# Patient Record
Sex: FEMALE | Race: BLACK OR AFRICAN AMERICAN | NOT HISPANIC OR LATINO
[De-identification: names, ages, dates, MRNs, and addresses within clinical notes are randomized per-mention and may not be internally consistent; named-entity substitution may affect disease eponyms.]

---

## 2018-12-01 PROBLEM — Z00.00 ENCOUNTER FOR PREVENTIVE HEALTH EXAMINATION: Status: ACTIVE | Noted: 2018-12-01

## 2019-01-21 PROBLEM — Z63.4 WIDOWED: Status: ACTIVE | Noted: 2019-01-21

## 2019-01-21 PROBLEM — D25.9 UTERINE FIBROID: Status: ACTIVE | Noted: 2019-01-21

## 2019-01-21 RX ORDER — LATANOPROST 50 UG/ML
0.01 SOLUTION OPHTHALMIC
Refills: 0 | Status: ACTIVE | COMMUNITY

## 2019-01-24 ENCOUNTER — APPOINTMENT (OUTPATIENT)
Dept: HEART AND VASCULAR | Facility: CLINIC | Age: 74
End: 2019-01-24
Payer: MEDICARE

## 2019-01-24 ENCOUNTER — NON-APPOINTMENT (OUTPATIENT)
Age: 74
End: 2019-01-24

## 2019-01-24 VITALS
SYSTOLIC BLOOD PRESSURE: 138 MMHG | DIASTOLIC BLOOD PRESSURE: 74 MMHG | OXYGEN SATURATION: 98 % | BODY MASS INDEX: 24.11 KG/M2 | WEIGHT: 131 LBS | HEIGHT: 62 IN | HEART RATE: 67 BPM

## 2019-01-24 DIAGNOSIS — D25.9 LEIOMYOMA OF UTERUS, UNSPECIFIED: ICD-10-CM

## 2019-01-24 DIAGNOSIS — Z63.4 DISAPPEARANCE AND DEATH OF FAMILY MEMBER: ICD-10-CM

## 2019-01-24 PROCEDURE — 93000 ELECTROCARDIOGRAM COMPLETE: CPT

## 2019-01-24 PROCEDURE — 99213 OFFICE O/P EST LOW 20 MIN: CPT | Mod: 25

## 2019-01-24 RX ORDER — ASPIRIN 81 MG/1
81 TABLET, CHEWABLE ORAL
Qty: 90 | Refills: 3 | Status: ACTIVE | COMMUNITY

## 2019-01-24 SDOH — SOCIAL STABILITY - SOCIAL INSECURITY: DISSAPEARANCE AND DEATH OF FAMILY MEMBER: Z63.4

## 2019-01-24 NOTE — ASSESSMENT
[FreeTextEntry1] : 1. CAD s/p multiple PCI, CCS 1, s/p negative stress echo 10/17/13, s/p 03/07/17: exercise stress echo: EF 65%, 7 METS, normal wall motion and PA pressures, hypertensive BP response. \par             - will continue long term dual antiplatelet therapy given numerous PCIs in the past. \par             - will order an echocardiogram to assess LV function\par \par 2. HTN: BP at ACC/AHA 2017 guideline target\par             - continue labetalol 300mg po bid\par             - continue losartan 100mg po daily\par \par 3. Diabetes type II: \par             - continue metformin 1000mg po bid\par             - continue glimepiride 2mg po daily\par             - discussed with patient therapeutic lifestyle changes to improve glucose metabolism\par \par 4. Dyslipidemia: \par             - continue atorvastatin 80mg po daily\par             - discussed with patient therapeutic lifestyle changes to improve lipid metabolism.

## 2019-01-24 NOTE — REASON FOR VISIT
[Follow-Up - Clinic] : a clinic follow-up of [FreeTextEntry1] : Diagnostic Tests:\par -----------------------------------------------\par ECG:\par 01/24/18: NSR, incomplete RBBB. \par 09/24/18 NSR, DAVID, non-specific ST/T changes. \par 10/18/17: NSR, DAVID, non-specific ST/T changes. \par 11/10/16: NSR, non-specific ST/T changes.\par 02/18/15: NSR, non-specific ST/T changes. \par -----------------------------------------------\par Cath:\par 12/21/11: RCA PCI mid and distal, LCX distal 70%, D2 70%\par 12/18/06: patent stents RCA/LAD/LCX, PCI prox RCA\par 12/12/05: RCA 90%, LM 40%, LAD prox 40%, mid 90%, LCX 90%, EF 60%, PCI LCX/LAD/RCA\par 09/12/05: PCI mid LAD, PCI prox RCA\par 09/09/05: RCA prox 90%, mid 50%,LM 40%, LAD prox 40%, mid 90%, EF 60%, PCI LCx\par ------------------------------------------------\par Echo:\par 06/15/16: EF 65%, grade I diastolic dysfunction, mild MR\par 02/08/11: EF 63%, diastolic dysfunction, trace MR/TR\par -------------------------------------------------\par Stress tests:\par 03/07/17: exercise stress echo: EF 65%, 7 METS, normal wall motion and PA pressures, hypertensive BP response. \par 10/174/13: dobutamine stress echo: EF 70%, normal wall motion.

## 2019-01-24 NOTE — PHYSICAL EXAM
[General Appearance - Well Developed] : well developed [Normal Appearance] : normal appearance [Well Groomed] : well groomed [General Appearance - Well Nourished] : well nourished [No Deformities] : no deformities [General Appearance - In No Acute Distress] : no acute distress [Normal Conjunctiva] : the conjunctiva exhibited no abnormalities [Eyelids - No Xanthelasma] : the eyelids demonstrated no xanthelasmas [Normal Oral Mucosa] : normal oral mucosa [No Oral Pallor] : no oral pallor [No Oral Cyanosis] : no oral cyanosis [Normal Jugular Venous A Waves Present] : normal jugular venous A waves present [Normal Jugular Venous V Waves Present] : normal jugular venous V waves present [No Jugular Venous Andrea A Waves] : no jugular venous andrea A waves [Respiration, Rhythm And Depth] : normal respiratory rhythm and effort [Exaggerated Use Of Accessory Muscles For Inspiration] : no accessory muscle use [Auscultation Breath Sounds / Voice Sounds] : lungs were clear to auscultation bilaterally [Normal Rate] : normal [Normal S1] : normal S1 [Normal S2] : normal S2 [No Murmur] : no murmurs heard [2+] : left 2+ [No Abnormalities] : the abdominal aorta was not enlarged and no bruit was heard [No Pitting Edema] : no pitting edema present [Abdomen Soft] : soft [Abdomen Tenderness] : non-tender [Abdomen Mass (___ Cm)] : no abdominal mass palpated [Abnormal Walk] : normal gait [Gait - Sufficient For Exercise Testing] : the gait was sufficient for exercise testing [Nail Clubbing] : no clubbing of the fingernails [Cyanosis, Localized] : no localized cyanosis [Petechial Hemorrhages (___cm)] : no petechial hemorrhages [Skin Color & Pigmentation] : normal skin color and pigmentation [] : no rash [No Venous Stasis] : no venous stasis [Skin Lesions] : no skin lesions [No Skin Ulcers] : no skin ulcer [No Xanthoma] : no  xanthoma was observed [Oriented To Time, Place, And Person] : oriented to person, place, and time [Affect] : the affect was normal [Mood] : the mood was normal [No Anxiety] : not feeling anxious [S3] : no S3 [S4] : no S4 [Right Carotid Bruit] : no bruit heard over the right carotid [Left Carotid Bruit] : no bruit heard over the left carotid [Right Femoral Bruit] : no bruit heard over the right femoral artery [Left Femoral Bruit] : no bruit heard over the left femoral artery

## 2019-11-11 ENCOUNTER — NON-APPOINTMENT (OUTPATIENT)
Age: 74
End: 2019-11-11

## 2019-11-11 ENCOUNTER — APPOINTMENT (OUTPATIENT)
Dept: HEART AND VASCULAR | Facility: CLINIC | Age: 74
End: 2019-11-11
Payer: MEDICARE

## 2019-11-11 VITALS
DIASTOLIC BLOOD PRESSURE: 71 MMHG | TEMPERATURE: 98.2 F | OXYGEN SATURATION: 97 % | BODY MASS INDEX: 24.67 KG/M2 | HEART RATE: 78 BPM | HEIGHT: 62 IN | SYSTOLIC BLOOD PRESSURE: 148 MMHG | WEIGHT: 134.04 LBS

## 2019-11-11 VITALS — DIASTOLIC BLOOD PRESSURE: 80 MMHG | SYSTOLIC BLOOD PRESSURE: 162 MMHG

## 2019-11-11 PROCEDURE — 99214 OFFICE O/P EST MOD 30 MIN: CPT | Mod: 25

## 2019-11-11 PROCEDURE — 93000 ELECTROCARDIOGRAM COMPLETE: CPT

## 2019-11-11 RX ORDER — LOSARTAN POTASSIUM 100 MG/1
100 TABLET, FILM COATED ORAL DAILY
Qty: 90 | Refills: 3 | Status: DISCONTINUED | COMMUNITY
Start: 1900-01-01 | End: 2019-11-11

## 2019-11-11 NOTE — PHYSICAL EXAM
[II] : a grade 2 [S3] : no S3 [Right Carotid Bruit] : no bruit heard over the right carotid [Left Carotid Bruit] : no bruit heard over the left carotid [S4] : no S4 [Left Femoral Bruit] : no bruit heard over the left femoral artery [Right Femoral Bruit] : no bruit heard over the right femoral artery

## 2019-11-11 NOTE — ASSESSMENT
[FreeTextEntry1] : 1. CAD s/p multiple PCI, CCS 1, s/p negative stress echo 10/17/13, s/p 03/07/17: exercise stress echo: EF 65%, 7 METS, normal wall motion and PA pressures, hypertensive BP response. \par             - will continue long term dual antiplatelet therapy given numerous PCIs in the past. \par             - will send for an exercise stress echocardiogram to rule out inducible ischemia given new onset chest pressure (risks, benefits,alternatives discussed, all procedure related questions answered)\par \par 2. HTN: BP at ACC/AHA 2017 guideline target\par             - continue labetalol 300mg po bid\par             - will change losartan to telmisartan/HCT 80/25mg po QD (possible adverse effects of new medications discussed)\par \par 3. Diabetes type II: \par             - continue metformin 1000mg po bid\par             - continue glimepiride 2mg po daily\par             - discussed with patient therapeutic lifestyle changes to improve glucose metabolism\par \par 4. Dyslipidemia: \par             - continue atorvastatin 80mg po daily\par             - discussed with patient therapeutic lifestyle changes to improve lipid metabolism.

## 2019-11-11 NOTE — HISTORY OF PRESENT ILLNESS
[FreeTextEntry1] : Ms. Schulz presents for follow up and management of CAD s/p multi-vessel PCI, HTN, dyslipidemia, and DM2. She denies chest pain, OZUNA, or edema. She had an echocardiogram on 06/15/16 which revealed an EF of 65%, grade I diastolic dysfunction, and mild MR. She had an exercise stress echocardiogram on 03/07/17 which revealed an EF of 65%, 7 METS, normal wall motion and PA pressures, and a hypertensive BP response.  She has complaints of exertional chest pressure but is not able to describe the symptoms well.  \par \par

## 2019-11-11 NOTE — REASON FOR VISIT
[FreeTextEntry1] : Diagnostic Tests:\par -----------------------------------------------\par ECG:\par 11/11/19:  NSR, normal ECG. \par 01/24/18: NSR, incomplete RBBB. \par 09/24/18  NSR, DAVID, non-specific ST/T changes. \par 10/18/17: NSR, DAVID, non-specific ST/T changes. \par 11/10/16: NSR, non-specific ST/T changes.\par 02/18/15: NSR, non-specific ST/T changes. \par -----------------------------------------------\par Cath:\par 12/21/11: RCA PCI mid and distal, LCX distal 70%, D2 70%\par 12/18/06: patent stents RCA/LAD/LCX, PCI prox RCA\par 12/12/05: RCA 90%, LM 40%, LAD prox 40%, mid 90%, LCX 90%, EF 60%, PCI LCX/LAD/RCA\par 09/12/05: PCI mid LAD, PCI prox RCA\par 09/09/05: RCA prox 90%, mid 50%,LM 40%, LAD prox 40%, mid 90%, EF 60%, PCI LCx\par ------------------------------------------------\par Echo:\par 06/15/16: EF 65%, grade I diastolic dysfunction, mild MR\par 02/08/11: EF 63%, diastolic dysfunction, trace MR/TR\par -------------------------------------------------\par Stress tests:\par 03/07/17: exercise stress echo: EF 65%, 7 METS, normal wall motion and PA pressures, hypertensive BP response. \par 10/174/13: dobutamine stress echo: EF 70%, normal wall motion.

## 2019-11-21 ENCOUNTER — APPOINTMENT (OUTPATIENT)
Dept: INTERNAL MEDICINE | Facility: CLINIC | Age: 74
End: 2019-11-21

## 2019-12-03 ENCOUNTER — APPOINTMENT (OUTPATIENT)
Dept: HEART AND VASCULAR | Facility: CLINIC | Age: 74
End: 2019-12-03
Payer: MEDICARE

## 2019-12-03 PROCEDURE — 93351 STRESS TTE COMPLETE: CPT

## 2020-01-13 ENCOUNTER — APPOINTMENT (OUTPATIENT)
Dept: HEART AND VASCULAR | Facility: CLINIC | Age: 75
End: 2020-01-13
Payer: MEDICARE

## 2020-01-13 VITALS
BODY MASS INDEX: 24.11 KG/M2 | HEIGHT: 62 IN | SYSTOLIC BLOOD PRESSURE: 125 MMHG | TEMPERATURE: 98.1 F | DIASTOLIC BLOOD PRESSURE: 65 MMHG | HEART RATE: 85 BPM | WEIGHT: 131 LBS | OXYGEN SATURATION: 97 %

## 2020-01-13 PROCEDURE — 99214 OFFICE O/P EST MOD 30 MIN: CPT

## 2020-01-13 NOTE — REASON FOR VISIT
[FreeTextEntry1] : Diagnostic Tests:\par -----------------------------------------------\par ECG:\par 11/11/19:  NSR, normal ECG. \par 01/24/18: NSR, incomplete RBBB. \par 09/24/18  NSR, DAVID, non-specific ST/T changes. \par 10/18/17: NSR, DAVID, non-specific ST/T changes. \par 11/10/16: NSR, non-specific ST/T changes.\par 02/18/15: NSR, non-specific ST/T changes. \par -----------------------------------------------\par Cath:\par 12/21/11: RCA PCI mid and distal, LCX distal 70%, D2 70%\par 12/18/06: patent stents RCA/LAD/LCX, PCI prox RCA\par 12/12/05: RCA 90%, LM 40%, LAD prox 40%, mid 90%, LCX 90%, EF 60%, PCI LCX/LAD/RCA\par 09/12/05: PCI mid LAD, PCI prox RCA\par 09/09/05: RCA prox 90%, mid 50%,LM 40%, LAD prox 40%, mid 90%, EF 60%, PCI LCx\par ------------------------------------------------\par Echo:\par 06/15/16: EF 65%, grade I diastolic dysfunction, mild MR\par 02/08/11: EF 63%, diastolic dysfunction, trace MR/TR\par -------------------------------------------------\par Stress tests:\par 12/03/19: exercise stress echo: EF 68%, aortic sclerosis, 7 METS, + exercise-induced ischemia basal inferior wall, normal ECG and PA pressures. \par 03/07/17: exercise stress echo: EF 65%, 7 METS, normal wall motion and PA pressures, hypertensive BP response. \par 10/174/13: dobutamine stress echo: EF 70%, normal wall motion.

## 2020-01-13 NOTE — HISTORY OF PRESENT ILLNESS
[FreeTextEntry1] : Ms. Schulz presents for follow up and management of CAD s/p multi-vessel PCI, HTN, dyslipidemia, and DM2. Last visit, she had complaints of chest pain and she underwent an exercise stress echocardiogram on 12/03/19 which revealed an EF of 68%, aortic sclerosis, 7 METS, + exercise-induced ischemia basal inferior wall, normal ECG and PA pressures.   The decision was made to pursue medical management and consider invasive coronary angiography if the symptoms progressed.  At present, she has stable mild exertional angina.  Her A1c on 11/13/19 was 9.2 and her PMD initiated Trulicity.  \par \par

## 2020-01-13 NOTE — ASSESSMENT
[FreeTextEntry1] : 1. CAD s/p multiple PCI, CCS 1, s/p negative stress echo 10/17/13, s/p 12/03/19: exercise stress echo: EF 68%, aortic sclerosis, 7 METS, + exercise-induced ischemia basal inferior wall, normal ECG and PA pressures. \par             - will continue long term dual antiplatelet therapy given numerous PCIs in the past. \par             - will consider pursuing invasive coronary if symptoms persist\par             - discussed with patient importance of seeking immediate medical attention if anginal type chest pain develops\par \par 2. HTN: BP at ACC/AHA 2017 guideline target\par             - continue labetalol 300mg po bid\par             - continue telmisartan/HCT 80/25mg po QD \par \par 3. Diabetes type II: \par             - continue metformin 1000mg po QD\par             - continue glimepiride 2mg po daily\par             - continue Trulicity 0.75mg SQ weekly\par             - discussed with patient therapeutic lifestyle changes to improve glucose metabolism\par \par 4. Dyslipidemia: LDL 81 (11/13/19)\par             - continue atorvastatin 80mg po daily\par             - discussed with patient therapeutic lifestyle changes to improve lipid metabolism.\par \par 5. r/o RIMA:\par              - will send for a carotid sonogram

## 2020-01-23 ENCOUNTER — APPOINTMENT (OUTPATIENT)
Dept: HEART AND VASCULAR | Facility: CLINIC | Age: 75
End: 2020-01-23
Payer: MEDICARE

## 2020-03-02 ENCOUNTER — APPOINTMENT (OUTPATIENT)
Dept: HEART AND VASCULAR | Facility: CLINIC | Age: 75
End: 2020-03-02
Payer: MEDICARE

## 2020-03-02 VITALS
HEART RATE: 92 BPM | BODY MASS INDEX: 23.6 KG/M2 | TEMPERATURE: 97.6 F | WEIGHT: 128.25 LBS | SYSTOLIC BLOOD PRESSURE: 129 MMHG | DIASTOLIC BLOOD PRESSURE: 75 MMHG | OXYGEN SATURATION: 98 % | HEIGHT: 62 IN

## 2020-03-02 PROCEDURE — 99214 OFFICE O/P EST MOD 30 MIN: CPT

## 2020-03-02 RX ORDER — CLOPIDOGREL BISULFATE 75 MG/1
75 TABLET, FILM COATED ORAL DAILY
Qty: 90 | Refills: 3 | Status: ACTIVE | COMMUNITY
Start: 1900-01-01 | End: 1900-01-01

## 2020-03-02 NOTE — REASON FOR VISIT
[Follow-Up - Clinic] : a clinic follow-up of [FreeTextEntry1] : Diagnostic Tests:\par -----------------------------------------------\par ECG:\par 11/11/19:  NSR, normal ECG. \par 01/24/18: NSR, incomplete RBBB. \par 09/24/18  NSR, DAVID, non-specific ST/T changes. \par 10/18/17: NSR, DAVID, non-specific ST/T changes. \par 11/10/16: NSR, non-specific ST/T changes.\par 02/18/15: NSR, non-specific ST/T changes. \par -----------------------------------------------\par Cath:\par 12/21/11: RCA PCI mid and distal, LCX distal 70%, D2 70%\par 12/18/06: patent stents RCA/LAD/LCX, PCI prox RCA\par 12/12/05: RCA 90%, LM 40%, LAD prox 40%, mid 90%, LCX 90%, EF 60%, PCI LCX/LAD/RCA\par 09/12/05: PCI mid LAD, PCI prox RCA\par 09/09/05: RCA prox 90%, mid 50%,LM 40%, LAD prox 40%, mid 90%, EF 60%, PCI LCx\par ------------------------------------------------\par Echo:\par 06/15/16: EF 65%, grade I diastolic dysfunction, mild MR\par 02/08/11: EF 63%, diastolic dysfunction, trace MR/TR\par -------------------------------------------------\par Stress tests:\par 12/03/19: exercise stress echo: EF 68%, aortic sclerosis, 7 METS, + exercise-induced ischemia basal inferior wall, normal ECG and PA pressures. \par 03/07/17: exercise stress echo: EF 65%, 7 METS, normal wall motion and PA pressures, hypertensive BP response. \par 10/174/13: dobutamine stress echo: EF 70%, normal wall motion.

## 2020-03-02 NOTE — HISTORY OF PRESENT ILLNESS
[FreeTextEntry1] : Ms. Schulz presents for follow up and management of CAD s/p multi-vessel PCI, HTN, dyslipidemia, and DM2. Last visit, she had complaints of chest pain and she underwent an exercise stress echocardiogram on 12/03/19 which revealed an EF of 68%, aortic sclerosis, 7 METS, + exercise-induced ischemia basal inferior wall, normal ECG and PA pressures.   The decision was made to pursue medical management and consider invasive coronary angiography if the symptoms progressed.  Her A1c on 11/13/19 was 9.2 and her PMD initiated Trulicity.  Since her last visit she is feeling well with no cardiovascular compliants. She has complaints of nausea and emesis on Trulicity. She went to see her endocrinologist, Ed Corbett MD and she reports, despite her A1c now being 7.2, he will hold her Trulicity.

## 2020-03-02 NOTE — REVIEW OF SYSTEMS
[Negative] : Heme/Lymph [Nausea] : nausea [Change in Appetite] : change in appetite [Vomiting] : vomiting [Palpitations] : no palpitations

## 2020-03-02 NOTE — PHYSICAL EXAM
[General Appearance - Well Developed] : well developed [Normal Appearance] : normal appearance [Well Groomed] : well groomed [General Appearance - In No Acute Distress] : no acute distress [No Deformities] : no deformities [General Appearance - Well Nourished] : well nourished [Normal Conjunctiva] : the conjunctiva exhibited no abnormalities [Eyelids - No Xanthelasma] : the eyelids demonstrated no xanthelasmas [Normal Oral Mucosa] : normal oral mucosa [No Oral Pallor] : no oral pallor [Normal Jugular Venous A Waves Present] : normal jugular venous A waves present [No Oral Cyanosis] : no oral cyanosis [Normal Jugular Venous V Waves Present] : normal jugular venous V waves present [No Jugular Venous Andrea A Waves] : no jugular venous andrea A waves [Respiration, Rhythm And Depth] : normal respiratory rhythm and effort [Exaggerated Use Of Accessory Muscles For Inspiration] : no accessory muscle use [Auscultation Breath Sounds / Voice Sounds] : lungs were clear to auscultation bilaterally [Abdomen Soft] : soft [Abdomen Mass (___ Cm)] : no abdominal mass palpated [Abdomen Tenderness] : non-tender [Abnormal Walk] : normal gait [Gait - Sufficient For Exercise Testing] : the gait was sufficient for exercise testing [Cyanosis, Localized] : no localized cyanosis [Nail Clubbing] : no clubbing of the fingernails [Petechial Hemorrhages (___cm)] : no petechial hemorrhages [Skin Color & Pigmentation] : normal skin color and pigmentation [] : no rash [No Venous Stasis] : no venous stasis [Skin Lesions] : no skin lesions [No Xanthoma] : no  xanthoma was observed [No Skin Ulcers] : no skin ulcer [Affect] : the affect was normal [Oriented To Time, Place, And Person] : oriented to person, place, and time [Mood] : the mood was normal [No Anxiety] : not feeling anxious [Normal Rate] : normal [Normal S1] : normal S1 [Normal S2] : normal S2 [No Murmur] : no murmurs heard [II] : a grade 2 [2+] : left 2+ [No Abnormalities] : the abdominal aorta was not enlarged and no bruit was heard [No Pitting Edema] : no pitting edema present [S3] : no S3 [S4] : no S4 [Right Carotid Bruit] : no bruit heard over the right carotid [Right Femoral Bruit] : no bruit heard over the right femoral artery [Left Carotid Bruit] : no bruit heard over the left carotid [Left Femoral Bruit] : no bruit heard over the left femoral artery

## 2020-03-02 NOTE — ASSESSMENT
[FreeTextEntry1] : 1. CAD s/p multiple PCI, CCS 1, s/p negative stress echo 10/17/13, s/p 12/03/19: exercise stress echo: EF 68%, aortic sclerosis, 7 METS, + exercise-induced ischemia basal inferior wall, normal ECG and PA pressures. \par             - will continue long term dual antiplatelet therapy given numerous PCIs in the past. \par             - will consider pursuing invasive coronary if symptoms persist\par             - discussed with patient importance of seeking immediate medical attention if anginal type chest pain develops\par \par 2. HTN: BP at ACC/AHA 2017 guideline target\par             - continue labetalol 300mg po bid\par             - continue telmisartan/HCT 80/25mg po QD \par \par 3. Diabetes type II: \par             - continue metformin 1000mg po QD\par             - continue glimepiride 2mg po daily\par             - continue off Trulicity 0.75mg SQ weekly secondarty to nausea and emesis\par             - discussed with patient therapeutic lifestyle changes to improve glucose metabolism\par             - follow up with endocrinologist, Ed Corbett MD \par \par 4. Dyslipidemia: LDL 81 (11/13/19)\par             - continue atorvastatin 80mg po daily\par             - discussed with patient therapeutic lifestyle changes to improve lipid metabolism.\par \par 5. r/o RIMA:\par              - will send for a carotid sonogram (ordered last visit)

## 2020-10-20 ENCOUNTER — APPOINTMENT (OUTPATIENT)
Dept: HEART AND VASCULAR | Facility: CLINIC | Age: 75
End: 2020-10-20
Payer: MEDICARE

## 2020-10-20 ENCOUNTER — NON-APPOINTMENT (OUTPATIENT)
Age: 75
End: 2020-10-20

## 2020-10-20 VITALS — DIASTOLIC BLOOD PRESSURE: 78 MMHG | SYSTOLIC BLOOD PRESSURE: 131 MMHG

## 2020-10-20 VITALS
BODY MASS INDEX: 24.66 KG/M2 | HEART RATE: 72 BPM | WEIGHT: 134 LBS | TEMPERATURE: 98.2 F | SYSTOLIC BLOOD PRESSURE: 148 MMHG | HEIGHT: 62 IN | OXYGEN SATURATION: 97 % | DIASTOLIC BLOOD PRESSURE: 74 MMHG

## 2020-10-20 PROCEDURE — 93000 ELECTROCARDIOGRAM COMPLETE: CPT

## 2020-10-20 PROCEDURE — 99214 OFFICE O/P EST MOD 30 MIN: CPT | Mod: 25

## 2020-10-20 NOTE — REVIEW OF SYSTEMS
[Nausea] : nausea [Vomiting] : vomiting [Change in Appetite] : change in appetite [Negative] : Heme/Lymph [Palpitations] : no palpitations

## 2020-10-20 NOTE — ASSESSMENT
[FreeTextEntry1] : 1. CAD s/p multiple PCI, CCS 1, s/p negative stress echo 10/17/13, s/p 12/03/19: exercise stress echo: EF 68%, aortic sclerosis, 7 METS, + exercise-induced ischemia basal inferior wall, normal ECG and PA pressures. \par             - will continue long term dual antiplatelet therapy given numerous PCIs in the past. \par             - will consider pursuing invasive coronary if symptoms persist\par             - discussed with patient importance of seeking immediate medical attention if anginal type chest pain develops\par             - will send for an echocardiogram\par \par 2. HTN: BP near ACC/AHA 2017 guideline target\par             - continue labetalol 300mg po bid\par             - continue telmisartan/HCT 80/25mg po QD \par \par 3. Diabetes type II: not controlled (A1c 9.1% 10/02/20)\par             - continue metformin 1000mg po QD\par             - continue glimepiride 2mg po daily\par             - continue Tradjenta 5mg po QD\par             - discussed with patient therapeutic lifestyle changes to improve glucose metabolism\par             - follow up with endocrinologist, Ed Corbett MD \par \par 4. Dyslipidemia: lipids at goal\par             - continue atorvastatin 80mg po daily\par             - discussed with patient therapeutic lifestyle changes to improve lipid metabolism.\par \par 5. r/o RIMA:\par              - will send for a carotid sonogram

## 2020-10-20 NOTE — HISTORY OF PRESENT ILLNESS
[FreeTextEntry1] : Ms. Schulz presents for follow up and management of CAD s/p multi-vessel PCI, HTN, dyslipidemia, and DM2.  She underwent an exercise stress echocardiogram on 12/03/19 which revealed an EF of 68%, aortic sclerosis, 7 METS, + exercise-induced ischemia basal inferior wall, normal ECG and PA pressures.   The decision was made to pursue medical management and consider invasive coronary angiography if the symptoms progressed.  She saw an advertisement on television warning of the recent metformin recall and discontinued it without speaking with her endocrinologist.  Her A1c increased to 9.1 as a result.  Her endocrinologist has since restarted the metformin.  She denies chest pain and has OZUNA to 1-2 flights of stairs.

## 2020-10-20 NOTE — REASON FOR VISIT
[Follow-Up - Clinic] : a clinic follow-up of [FreeTextEntry1] : Diagnostic Tests:\par -----------------------------------------------\par ECG:\par 10/20/20: NSR, nonspecific T-wave abnormality. /24/18: NSR, incomplete RBBB. \par 01/11/19:  NSR, normal ECG. \par 09/24/18  NSR, DAVID, non-specific ST/T changes. \par 10/18/17: NSR, DAVID, non-specific ST/T changes. \par 11/10/16: NSR, non-specific ST/T changes.\par 02/18/15: NSR, non-specific ST/T changes. \par -----------------------------------------------\par Cath:\par 12/21/11: RCA PCI mid and distal, LCX distal 70%, D2 70%\par 12/18/06: patent stents RCA/LAD/LCX, PCI prox RCA\par 12/12/05: RCA 90%, LM 40%, LAD prox 40%, mid 90%, LCX 90%, EF 60%, PCI LCX/LAD/RCA\par 09/12/05: PCI mid LAD, PCI prox RCA\par 09/09/05: RCA prox 90%, mid 50%,LM 40%, LAD prox 40%, mid 90%, EF 60%, PCI LCx\par ------------------------------------------------\par Echo:\par 06/15/16: EF 65%, grade I diastolic dysfunction, mild MR\par 02/08/11: EF 63%, diastolic dysfunction, trace MR/TR\par -------------------------------------------------\par Stress tests:\par 12/03/19: exercise stress echo: EF 68%, aortic sclerosis, 7 METS, + exercise-induced ischemia basal inferior wall, normal ECG and PA pressures. \par 03/07/17: exercise stress echo: EF 65%, 7 METS, normal wall motion and PA pressures, hypertensive BP response. \par 10/174/13: dobutamine stress echo: EF 70%, normal wall motion.

## 2020-10-20 NOTE — PHYSICAL EXAM
[General Appearance - Well Developed] : well developed [Normal Appearance] : normal appearance [Well Groomed] : well groomed [General Appearance - Well Nourished] : well nourished [No Deformities] : no deformities [General Appearance - In No Acute Distress] : no acute distress [Normal Conjunctiva] : the conjunctiva exhibited no abnormalities [Eyelids - No Xanthelasma] : the eyelids demonstrated no xanthelasmas [No Oral Pallor] : no oral pallor [Normal Oral Mucosa] : normal oral mucosa [No Oral Cyanosis] : no oral cyanosis [Normal Jugular Venous A Waves Present] : normal jugular venous A waves present [Normal Jugular Venous V Waves Present] : normal jugular venous V waves present [No Jugular Venous Andrea A Waves] : no jugular venous andrea A waves [Exaggerated Use Of Accessory Muscles For Inspiration] : no accessory muscle use [Respiration, Rhythm And Depth] : normal respiratory rhythm and effort [Auscultation Breath Sounds / Voice Sounds] : lungs were clear to auscultation bilaterally [Abdomen Tenderness] : non-tender [Abdomen Soft] : soft [Abdomen Mass (___ Cm)] : no abdominal mass palpated [Abnormal Walk] : normal gait [Gait - Sufficient For Exercise Testing] : the gait was sufficient for exercise testing [Nail Clubbing] : no clubbing of the fingernails [Cyanosis, Localized] : no localized cyanosis [Petechial Hemorrhages (___cm)] : no petechial hemorrhages [Skin Color & Pigmentation] : normal skin color and pigmentation [] : no rash [Skin Lesions] : no skin lesions [No Venous Stasis] : no venous stasis [No Skin Ulcers] : no skin ulcer [Oriented To Time, Place, And Person] : oriented to person, place, and time [No Xanthoma] : no  xanthoma was observed [Affect] : the affect was normal [Mood] : the mood was normal [No Anxiety] : not feeling anxious [Normal Rate] : normal [Normal S1] : normal S1 [Normal S2] : normal S2 [No Murmur] : no murmurs heard [II] : a grade 2 [2+] : right 2+ [No Abnormalities] : the abdominal aorta was not enlarged and no bruit was heard [No Pitting Edema] : no pitting edema present [S3] : no S3 [S4] : no S4 [Right Carotid Bruit] : no bruit heard over the right carotid [Left Carotid Bruit] : no bruit heard over the left carotid [Right Femoral Bruit] : no bruit heard over the right femoral artery [Left Femoral Bruit] : no bruit heard over the left femoral artery

## 2020-11-16 ENCOUNTER — APPOINTMENT (OUTPATIENT)
Dept: HEART AND VASCULAR | Facility: CLINIC | Age: 75
End: 2020-11-16
Payer: MEDICARE

## 2020-11-16 ENCOUNTER — NON-APPOINTMENT (OUTPATIENT)
Age: 75
End: 2020-11-16

## 2020-11-16 PROCEDURE — 93880 EXTRACRANIAL BILAT STUDY: CPT

## 2020-11-16 PROCEDURE — 93306 TTE W/DOPPLER COMPLETE: CPT

## 2021-01-08 ENCOUNTER — RX RENEWAL (OUTPATIENT)
Age: 76
End: 2021-01-08

## 2021-04-11 ENCOUNTER — RX RENEWAL (OUTPATIENT)
Age: 76
End: 2021-04-11

## 2021-04-11 RX ORDER — ATORVASTATIN CALCIUM 80 MG/1
80 TABLET, FILM COATED ORAL DAILY
Qty: 90 | Refills: 3 | Status: ACTIVE | COMMUNITY
Start: 2021-04-11 | End: 1900-01-01

## 2021-10-26 ENCOUNTER — APPOINTMENT (OUTPATIENT)
Dept: HEART AND VASCULAR | Facility: CLINIC | Age: 76
End: 2021-10-26
Payer: MEDICARE

## 2021-10-26 ENCOUNTER — NON-APPOINTMENT (OUTPATIENT)
Age: 76
End: 2021-10-26

## 2021-10-26 VITALS
HEART RATE: 75 BPM | HEIGHT: 62 IN | WEIGHT: 126 LBS | OXYGEN SATURATION: 98 % | BODY MASS INDEX: 23.19 KG/M2 | SYSTOLIC BLOOD PRESSURE: 134 MMHG | DIASTOLIC BLOOD PRESSURE: 69 MMHG

## 2021-10-26 PROCEDURE — 99214 OFFICE O/P EST MOD 30 MIN: CPT | Mod: 25

## 2021-10-26 PROCEDURE — 93000 ELECTROCARDIOGRAM COMPLETE: CPT

## 2021-10-26 NOTE — HISTORY OF PRESENT ILLNESS
[FreeTextEntry1] : Ms. Schulz presents for follow up and management of CAD s/p multi-vessel PCI, HTN, dyslipidemia, and DM2.  She underwent an exercise stress echocardiogram on 12/03/19 which revealed an EF of 68%, aortic sclerosis, 7 METS, + exercise-induced ischemia basal inferior wall, normal ECG and PA pressures.   The decision was made to pursue medical management and consider invasive coronary angiography if the symptoms progressed.  She saw an advertisement on television warning of the recent metformin recall and discontinued it without speaking with her endocrinologist.  Her A1c increased to 9.1 as a result.  Her endocrinologist has since restarted the metformin.  On 11/16/20 she had an echocardiogram which revealed an EF of 67% and aortic sclerosis. On 02/19/21 her daughter called to inform me that she is out of the country in [facility] and has been experiencing chest pain.  At present, she continues to use SL NTG 1-2 times per month.

## 2021-10-26 NOTE — ASSESSMENT
[FreeTextEntry1] : 1. CAD s/p multiple PCI, CCS 1, s/p negative stress echo 10/17/13, s/p 12/03/19: exercise stress echo: EF 68%, aortic sclerosis, 7 METS, + exercise-induced ischemia basal inferior wall, normal ECG and PA pressures. \par             - will continue long term dual antiplatelet therapy given numerous PCIs in the past. \par             - will consider pursuing invasive coronary if symptoms persist\par             - discussed with patient importance of seeking immediate medical attention if anginal type chest pain develops\par             - will send for an exercise stress echocardiogram to rule out inducible ischemia \par             - discussed with patient importance of seeking immediate medical attention if anginal type chest pain develops \par \par 2. HTN: BP near ACC/AHA 2017 guideline target\par             - continue labetalol 300mg po bid\par             - continue telmisartan/HCT 80/25mg po qd\par             - if BP remains above target next visit will up titrate anti-HTN regimen  \par \par 3. Diabetes type II: not controlled (A1c 9.1% 10/02/20)\par             - continue metformin 1000mg po qd\par             - continue glimepiride 2mg po daily\par             - continue Tradjenta 5mg po qd\par             - discussed with patient therapeutic lifestyle changes to improve glucose metabolism\par             - follow up with endocrinologist, Ed Corbett MD \par             - check lab work today \par \par 4. Dyslipidemia: lipids at goal\par             - continue atorvastatin 80mg po daily\par             - discussed with patient therapeutic lifestyle changes to improve lipid metabolism\par             - check lab work \par \par 5. Carotid artery atherosclerosis:\par            - will pursue medical management\par            - will follow with serial carotid artery sonograms (ordered today) \par \par \par An ECG was obtained to evaluate heart rhythm and screen for any underlying cardiac abnormalities.

## 2021-10-27 LAB
ALBUMIN SERPL ELPH-MCNC: 4.5 G/DL
ALP BLD-CCNC: 100 U/L
ALT SERPL-CCNC: 12 U/L
ANION GAP SERPL CALC-SCNC: 17 MMOL/L
AST SERPL-CCNC: 16 U/L
BASOPHILS # BLD AUTO: 0.07 K/UL
BASOPHILS NFR BLD AUTO: 0.7 %
BILIRUB SERPL-MCNC: 0.4 MG/DL
BUN SERPL-MCNC: 14 MG/DL
CALCIUM SERPL-MCNC: 11.2 MG/DL
CHLORIDE SERPL-SCNC: 101 MMOL/L
CHOLEST SERPL-MCNC: 181 MG/DL
CO2 SERPL-SCNC: 23 MMOL/L
CREAT SERPL-MCNC: 0.64 MG/DL
EOSINOPHIL # BLD AUTO: 0.12 K/UL
EOSINOPHIL NFR BLD AUTO: 1.2 %
ESTIMATED AVERAGE GLUCOSE: 237 MG/DL
GLUCOSE SERPL-MCNC: 212 MG/DL
HBA1C MFR BLD HPLC: 9.9 %
HCT VFR BLD CALC: 39 %
HDLC SERPL-MCNC: 54 MG/DL
HGB BLD-MCNC: 12.1 G/DL
IMM GRANULOCYTES NFR BLD AUTO: 0.2 %
LDLC SERPL CALC-MCNC: 82 MG/DL
LDLC SERPL DIRECT ASSAY-MCNC: 95 MG/DL
LYMPHOCYTES # BLD AUTO: 2.95 K/UL
LYMPHOCYTES NFR BLD AUTO: 30.6 %
MAN DIFF?: NORMAL
MCHC RBC-ENTMCNC: 28.4 PG
MCHC RBC-ENTMCNC: 31 GM/DL
MCV RBC AUTO: 91.5 FL
MONOCYTES # BLD AUTO: 0.67 K/UL
MONOCYTES NFR BLD AUTO: 7 %
NEUTROPHILS # BLD AUTO: 5.81 K/UL
NEUTROPHILS NFR BLD AUTO: 60.3 %
NONHDLC SERPL-MCNC: 127 MG/DL
PLATELET # BLD AUTO: 258 K/UL
POTASSIUM SERPL-SCNC: 4.8 MMOL/L
PROT SERPL-MCNC: 7.8 G/DL
RBC # BLD: 4.26 M/UL
RBC # FLD: 14.7 %
SODIUM SERPL-SCNC: 141 MMOL/L
TRIGL SERPL-MCNC: 225 MG/DL
TSH SERPL-ACNC: 1.27 UIU/ML
WBC # FLD AUTO: 9.64 K/UL

## 2021-10-28 LAB — APO LP(A) SERPL-MCNC: 357.3 NMOL/L

## 2021-12-16 ENCOUNTER — APPOINTMENT (OUTPATIENT)
Dept: HEART AND VASCULAR | Facility: CLINIC | Age: 76
End: 2021-12-16

## 2021-12-21 ENCOUNTER — APPOINTMENT (OUTPATIENT)
Dept: HEART AND VASCULAR | Facility: CLINIC | Age: 76
End: 2021-12-21
Payer: MEDICARE

## 2021-12-21 VITALS
BODY MASS INDEX: 22.82 KG/M2 | SYSTOLIC BLOOD PRESSURE: 176 MMHG | WEIGHT: 124 LBS | TEMPERATURE: 97.2 F | DIASTOLIC BLOOD PRESSURE: 76 MMHG | OXYGEN SATURATION: 99 % | HEIGHT: 62 IN | HEART RATE: 80 BPM

## 2021-12-21 VITALS — DIASTOLIC BLOOD PRESSURE: 82 MMHG | SYSTOLIC BLOOD PRESSURE: 168 MMHG

## 2021-12-21 PROCEDURE — 99214 OFFICE O/P EST MOD 30 MIN: CPT

## 2021-12-21 NOTE — REASON FOR VISIT
[FreeTextEntry1] : Diagnostic Tests:\par -----------------------------------------------\par ECG:\par 10/26/21: sinus rhythm, normal ECG. \par 10/20/20: NSR, nonspecific T-wave abnormality.\par 01/11/19:  NSR, normal ECG. \par 09/24/18  NSR, DAVID, non-specific ST/T changes. \par 10/18/17: NSR, DAVID, non-specific ST/T changes. \par 11/10/16: NSR, non-specific ST/T changes.\par 02/18/15: NSR, non-specific ST/T changes. \par -----------------------------------------------\par Cath:\par 12/21/11: RCA PCI mid and distal, LCX distal 70%, D2 70%\par 12/18/06: patent stents RCA/LAD/LCX, PCI prox RCA\par 12/12/05: RCA 90%, LM 40%, LAD prox 40%, mid 90%, LCX 90%, EF 60%, PCI LCX/LAD/RCA\par 09/12/05: PCI mid LAD, PCI prox RCA\par 09/09/05: RCA prox 90%, mid 50%,LM 40%, LAD prox 40%, mid 90%, EF 60%, PCI LCx\par ------------------------------------------------\par Echo:\par 11/16/20: EF 67%, aortic sclerosis. \par 06/15/16: EF 65%, grade I diastolic dysfunction, mild MR\par 02/08/11: EF 63%, diastolic dysfunction, trace MR/TR\par -------------------------------------------------\par Stress tests:\par 12/03/19: exercise stress echo: EF 68%, aortic sclerosis, 7 METS, + exercise-induced ischemia basal inferior wall, normal ECG and PA pressures. \par 03/07/17: exercise stress echo: EF 65%, 7 METS, normal wall motion and PA pressures, hypertensive BP response. \par 10/174/13: dobutamine stress echo: EF 70%, normal wall motion.\par --------------------------------------------------\par Carotid arteries:\par 11/16/20: sono: b/l prox ICA 20-49% stenosis.

## 2021-12-21 NOTE — ASSESSMENT
[FreeTextEntry1] : 1. CAD s/p multiple PCI, CCS 1, s/p negative stress echo 10/17/13, s/p 12/03/19: exercise stress echo: EF 68%, aortic sclerosis, 7 METS, + exercise-induced ischemia basal inferior wall, normal ECG and PA pressures. \par             - will continue long term dual antiplatelet therapy given numerous PCIs in the past. \par             - will consider pursuing invasive coronary if symptoms persist\par             - discussed with patient importance of seeking immediate medical attention if anginal type chest pain develops\par             - will send for an exercise stress echocardiogram to rule out inducible ischemia \par             - discussed with patient importance of seeking immediate medical attention if anginal type chest pain develops \par \par 2. HTN: BP not at ACC/AHA 2017 guideline target: off medications today\par             - continue labetalol 300mg po bid\par             - continue telmisartan/HCT 80/25mg po qd\par             - if BP remains above target next visit will up titrate anti-HTN regimen  \par             - encouraged increased compliance \par \par 3. Diabetes type II: not controlled, A1c 9.4% (12/20/21)\par             - continue metformin 1000mg po qd\par             - continue glimepiride 2mg po daily\par             - continue Tradjenta 5mg po qd\par             - discussed with patient therapeutic lifestyle changes to improve glucose metabolism\par             - follow up with endocrinologist, Ed Corbett MD \par \par 4. Dyslipidemia: lipids at goal, LDL 93 (11/03/21)\par             - continue atorvastatin 80mg po daily\par             - discussed with patient therapeutic lifestyle changes to improve lipid metabolism\par \par 5. Carotid artery atherosclerosis:\par            - will pursue medical management\par            - will follow with serial carotid artery sonograms (ordered today) \par \par 6. UTI: on Farxiga\par            - will treat with Cipro 500mg po qd x 3 days\par            - follow up with PMD and endocrinologist \par \par

## 2021-12-21 NOTE — HISTORY OF PRESENT ILLNESS
[FreeTextEntry1] : Ms. Schulz presents for follow up and management of CAD s/p multi-vessel PCI, HTN, dyslipidemia, and DM2.  On 11/16/20 she had an echocardiogram which revealed an EF of 67% and aortic sclerosis.  For the past two days, she has complaints of dysuria and urinary frequency.  She was evaluated by an NP at her PMD's office and was found to have an abnormal UA.  Given that she is on an SGLT2 inhibitor (Farxiga) we agreed to treat her presumptively for a UTI with ciprofloxacin for 3 days.  In addition, she has complaints of occasional stable exertional angina.

## 2021-12-21 NOTE — REVIEW OF SYSTEMS
[Dyspnea on exertion] : dyspnea during exertion [Chest Discomfort] : chest discomfort [Dysuria] : dysuria

## 2021-12-23 ENCOUNTER — APPOINTMENT (OUTPATIENT)
Dept: HEART AND VASCULAR | Facility: CLINIC | Age: 76
End: 2021-12-23
Payer: MEDICARE

## 2022-02-14 ENCOUNTER — APPOINTMENT (OUTPATIENT)
Dept: HEART AND VASCULAR | Facility: CLINIC | Age: 77
End: 2022-02-14
Payer: MEDICARE

## 2022-02-14 VITALS
BODY MASS INDEX: 22.82 KG/M2 | OXYGEN SATURATION: 100 % | SYSTOLIC BLOOD PRESSURE: 134 MMHG | HEIGHT: 62 IN | WEIGHT: 124 LBS | DIASTOLIC BLOOD PRESSURE: 76 MMHG | HEART RATE: 71 BPM

## 2022-02-14 PROCEDURE — 93351 STRESS TTE COMPLETE: CPT | Mod: 59

## 2022-02-14 PROCEDURE — 99214 OFFICE O/P EST MOD 30 MIN: CPT | Mod: 25

## 2022-02-14 RX ORDER — BLOOD SUGAR DIAGNOSTIC
STRIP MISCELLANEOUS
Qty: 100 | Refills: 0 | Status: ACTIVE | COMMUNITY
Start: 2021-11-03

## 2022-02-14 RX ORDER — LATANOPROST/PF 0.005 %
0.01 DROPS OPHTHALMIC (EYE) DAILY
Refills: 0 | Status: ACTIVE | COMMUNITY
Start: 2022-02-14

## 2022-02-14 RX ORDER — PEN NEEDLE, DIABETIC 29 G X1/2"
31G X 5 MM NEEDLE, DISPOSABLE MISCELLANEOUS
Qty: 100 | Refills: 0 | Status: ACTIVE | COMMUNITY
Start: 2022-02-06

## 2022-02-14 RX ORDER — ISOPROPYL ALCOHOL 0.75 G/1
SWAB TOPICAL
Qty: 100 | Refills: 0 | Status: ACTIVE | COMMUNITY
Start: 2021-11-03

## 2022-02-14 NOTE — HISTORY OF PRESENT ILLNESS
[FreeTextEntry1] : Ms. Schulz presents for follow up and management of CAD s/p multi-vessel PCI, HTN, dyslipidemia, and DM2.  On 11/16/20 she had an echocardiogram which revealed an EF of 67% and aortic sclerosis.  Today, 02/14/22, she had an exercise stress echocardiogram which revealed an EF of 64%, aortic sclerosis, and + basal inferior hypokinesis post 6.6 METS.  She has complaints of claudication to 1-2 blocks ambulation bilaterally.  \par

## 2022-02-14 NOTE — PHYSICAL EXAM
[Well Developed] : well developed [Well Nourished] : well nourished [No Acute Distress] : no acute distress [Normal Venous Pressure] : normal venous pressure [No Carotid Bruit] : no carotid bruit [Normal S1, S2] : normal S1, S2 [No Rub] : no rub [No Gallop] : no gallop [Clear Lung Fields] : clear lung fields [Good Air Entry] : good air entry [No Respiratory Distress] : no respiratory distress  [Soft] : abdomen soft [Non Tender] : non-tender [No Masses/organomegaly] : no masses/organomegaly [Normal Bowel Sounds] : normal bowel sounds [Normal Gait] : normal gait [No Edema] : no edema [No Cyanosis] : no cyanosis [No Clubbing] : no clubbing [No Varicosities] : no varicosities [No Rash] : no rash [No Skin Lesions] : no skin lesions [Moves all extremities] : moves all extremities [No Focal Deficits] : no focal deficits [Normal Speech] : normal speech [Alert and Oriented] : alert and oriented [Normal memory] : normal memory [General Appearance - Well Developed] : well developed [Normal Appearance] : normal appearance [Well Groomed] : well groomed [General Appearance - Well Nourished] : well nourished [No Deformities] : no deformities [General Appearance - In No Acute Distress] : no acute distress [Normal Conjunctiva] : the conjunctiva exhibited no abnormalities [Eyelids - No Xanthelasma] : the eyelids demonstrated no xanthelasmas [Normal Oral Mucosa] : normal oral mucosa [No Oral Pallor] : no oral pallor [No Oral Cyanosis] : no oral cyanosis [Normal Jugular Venous A Waves Present] : normal jugular venous A waves present [Normal Jugular Venous V Waves Present] : normal jugular venous V waves present [No Jugular Venous Andrea A Waves] : no jugular venous andrea A waves [Respiration, Rhythm And Depth] : normal respiratory rhythm and effort [Exaggerated Use Of Accessory Muscles For Inspiration] : no accessory muscle use [Auscultation Breath Sounds / Voice Sounds] : lungs were clear to auscultation bilaterally [Abdomen Soft] : soft [Abdomen Tenderness] : non-tender [Abdomen Mass (___ Cm)] : no abdominal mass palpated [Abnormal Walk] : normal gait [Gait - Sufficient For Exercise Testing] : the gait was sufficient for exercise testing [Nail Clubbing] : no clubbing of the fingernails [Cyanosis, Localized] : no localized cyanosis [Petechial Hemorrhages (___cm)] : no petechial hemorrhages [Skin Color & Pigmentation] : normal skin color and pigmentation [] : no rash [No Venous Stasis] : no venous stasis [Skin Lesions] : no skin lesions [No Skin Ulcers] : no skin ulcer [No Xanthoma] : no  xanthoma was observed [Oriented To Time, Place, And Person] : oriented to person, place, and time [Affect] : the affect was normal [Mood] : the mood was normal [No Anxiety] : not feeling anxious [Normal Rate] : normal [Normal S1] : normal S1 [Normal S2] : normal S2 [No Murmur] : no murmurs heard [II] : a grade 2 [2+] : left 2+ [No Abnormalities] : the abdominal aorta was not enlarged and no bruit was heard [No Pitting Edema] : no pitting edema present [S3] : no S3 [S4] : no S4 [Right Carotid Bruit] : no bruit heard over the right carotid [Right Femoral Bruit] : no bruit heard over the right femoral artery [Left Carotid Bruit] : no bruit heard over the left carotid [Left Femoral Bruit] : no bruit heard over the left femoral artery

## 2022-02-14 NOTE — REVIEW OF SYSTEMS
[Dyspnea on exertion] : dyspnea during exertion [Chest Discomfort] : chest discomfort [Negative] : Heme/Lymph [Muscle Cramps] : muscle cramps [Myalgia] : myalgia [Leg Claudication] : intermittent leg claudication [Dysuria] : no dysuria

## 2022-02-14 NOTE — REASON FOR VISIT
[Other: ____] : [unfilled] [FreeTextEntry1] : Diagnostic Tests:\par -----------------------------------------------\par ECG:\par 10/26/21: sinus rhythm, normal ECG. \par 10/20/20: NSR, nonspecific T-wave abnormality.\par 01/11/19:  NSR, normal ECG. \par 09/24/18  NSR, DAVID, non-specific ST/T changes. \par 10/18/17: NSR, DAVID, non-specific ST/T changes. \par 11/10/16: NSR, non-specific ST/T changes.\par 02/18/15: NSR, non-specific ST/T changes. \par -----------------------------------------------\par Cath:\par 12/21/11: RCA PCI mid and distal, LCX distal 70%, D2 70%\par 12/18/06: patent stents RCA/LAD/LCX, PCI prox RCA\par 12/12/05: RCA 90%, LM 40%, LAD prox 40%, mid 90%, LCX 90%, EF 60%, PCI LCX/LAD/RCA\par 09/12/05: PCI mid LAD, PCI prox RCA\par 09/09/05: RCA prox 90%, mid 50%,LM 40%, LAD prox 40%, mid 90%, EF 60%, PCI LCx\par ------------------------------------------------\par Echo:\par 11/16/20: EF 67%, aortic sclerosis. \par 06/15/16: EF 65%, grade I diastolic dysfunction, mild MR\par 02/08/11: EF 63%, diastolic dysfunction, trace MR/TR\par -------------------------------------------------\par Stress tests:\par 02/14/22: exercise stress echo: EF 64%, aortic sclerosis, + basal inferior hypokinesis post 6.6 METS.\par 12/03/19: exercise stress echo: EF 68%, aortic sclerosis, 7 METS, + exercise-induced ischemia basal inferior wall, normal ECG and PA pressures. \par 03/07/17: exercise stress echo: EF 65%, 7 METS, normal wall motion and PA pressures, hypertensive BP response. \par 10/174/13: dobutamine stress echo: EF 70%, normal wall motion.\par --------------------------------------------------\par Carotid arteries:\par 11/16/20: sono: b/l prox ICA 20-49% stenosis.

## 2022-02-14 NOTE — ASSESSMENT
[FreeTextEntry1] : 1. CAD s/p multiple PCI, CCS 1, s/p: 02/14/22: exercise stress echo: EF 64%, aortic sclerosis, + basal inferior hypokinesis post 6.6 METS.\par             - will continue long term dual antiplatelet therapy given numerous PCIs in the past. \par             - will consider pursuing invasive coronary if symptoms persist\par             - discussed with patient importance of seeking immediate medical attention if anginal type chest pain develops\par             \par 2. HTN: BP at ACC/AHA 2017 guideline target: her PMD switched telmisartan/HCT 80/25 to amlodipine 5\par             - continue labetalol 300mg po bid\par             - continue amlodipine 5mg po qd \par \par 3. Diabetes type II: not controlled, A1c 9.4% (12/20/21)\par             - continue metformin ER 500mg tabs 2 tabs po bid\par             - continue Trulicity 0.75mg/0.5ml sc qweek\par             - discussed with patient therapeutic lifestyle changes to improve glucose metabolism\par             - follow up with endocrinologist, Ed Corbett MD \par             - patient will provide copy of recent lab work from PMD's office for my review \par \par 4. Dyslipidemia: lipids at goal, LDL 93 (11/03/21)\par             - continue atorvastatin 80mg po daily\par             - discussed with patient therapeutic lifestyle changes to improve lipid metabolism\par             - patient will provide copy of recent lab work from PMD's office for my review \par \par 5. Carotid artery atherosclerosis:\par            - will pursue medical management\par            - will follow with serial carotid artery sonograms (ordered today)\par \par 6. r/o Peripheral artery disease:  + claudication:\par            - will send for bilateral lower extremity arterial sonography\par \par

## 2022-05-26 ENCOUNTER — NON-APPOINTMENT (OUTPATIENT)
Age: 77
End: 2022-05-26

## 2022-05-26 ENCOUNTER — APPOINTMENT (OUTPATIENT)
Dept: HEART AND VASCULAR | Facility: CLINIC | Age: 77
End: 2022-05-26
Payer: MEDICARE

## 2022-05-26 VITALS
HEART RATE: 78 BPM | WEIGHT: 123 LBS | SYSTOLIC BLOOD PRESSURE: 148 MMHG | DIASTOLIC BLOOD PRESSURE: 81 MMHG | OXYGEN SATURATION: 99 % | BODY MASS INDEX: 22.63 KG/M2 | HEIGHT: 62 IN | TEMPERATURE: 93.8 F

## 2022-05-26 VITALS — SYSTOLIC BLOOD PRESSURE: 153 MMHG | DIASTOLIC BLOOD PRESSURE: 78 MMHG

## 2022-05-26 PROCEDURE — 93925 LOWER EXTREMITY STUDY: CPT | Mod: 59

## 2022-05-26 PROCEDURE — 93000 ELECTROCARDIOGRAM COMPLETE: CPT

## 2022-05-26 PROCEDURE — 93880 EXTRACRANIAL BILAT STUDY: CPT

## 2022-05-26 PROCEDURE — 99214 OFFICE O/P EST MOD 30 MIN: CPT | Mod: 25

## 2022-05-26 RX ORDER — UBIDECARENONE/VIT E ACET 100MG-5
50 MCG CAPSULE ORAL
Qty: 90 | Refills: 3 | Status: ACTIVE | COMMUNITY
Start: 2022-05-26 | End: 1900-01-01

## 2022-05-26 RX ORDER — AMLODIPINE BESYLATE 10 MG/1
10 TABLET ORAL DAILY
Qty: 90 | Refills: 3 | Status: ACTIVE | COMMUNITY
Start: 2022-02-04 | End: 1900-01-01

## 2022-05-26 NOTE — REVIEW OF SYSTEMS
[Dyspnea on exertion] : dyspnea during exertion [Chest Discomfort] : chest discomfort [Leg Claudication] : intermittent leg claudication [Muscle Cramps] : muscle cramps [Myalgia] : myalgia [Negative] : Heme/Lymph [Dysuria] : no dysuria

## 2022-05-26 NOTE — PHYSICAL EXAM
[Well Developed] : well developed [Well Nourished] : well nourished [No Acute Distress] : no acute distress [Normal Venous Pressure] : normal venous pressure [No Carotid Bruit] : no carotid bruit [Normal S1, S2] : normal S1, S2 [No Rub] : no rub [No Gallop] : no gallop [Clear Lung Fields] : clear lung fields [Good Air Entry] : good air entry [No Respiratory Distress] : no respiratory distress  [Soft] : abdomen soft [Non Tender] : non-tender [No Masses/organomegaly] : no masses/organomegaly [Normal Bowel Sounds] : normal bowel sounds [Normal Gait] : normal gait [No Edema] : no edema [No Cyanosis] : no cyanosis [No Clubbing] : no clubbing [No Varicosities] : no varicosities [No Rash] : no rash [No Skin Lesions] : no skin lesions [Moves all extremities] : moves all extremities [No Focal Deficits] : no focal deficits [Normal Speech] : normal speech [Alert and Oriented] : alert and oriented [Normal memory] : normal memory [General Appearance - Well Developed] : well developed [Normal Appearance] : normal appearance [Well Groomed] : well groomed [General Appearance - Well Nourished] : well nourished [No Deformities] : no deformities [General Appearance - In No Acute Distress] : no acute distress [Normal Conjunctiva] : the conjunctiva exhibited no abnormalities [Eyelids - No Xanthelasma] : the eyelids demonstrated no xanthelasmas [Normal Oral Mucosa] : normal oral mucosa [No Oral Pallor] : no oral pallor [No Oral Cyanosis] : no oral cyanosis [Normal Jugular Venous A Waves Present] : normal jugular venous A waves present [Normal Jugular Venous V Waves Present] : normal jugular venous V waves present [No Jugular Venous Andrea A Waves] : no jugular venous andrea A waves [Respiration, Rhythm And Depth] : normal respiratory rhythm and effort [Exaggerated Use Of Accessory Muscles For Inspiration] : no accessory muscle use [Auscultation Breath Sounds / Voice Sounds] : lungs were clear to auscultation bilaterally [Abdomen Soft] : soft [Abdomen Tenderness] : non-tender [Abdomen Mass (___ Cm)] : no abdominal mass palpated [Abnormal Walk] : normal gait [Gait - Sufficient For Exercise Testing] : the gait was sufficient for exercise testing [Nail Clubbing] : no clubbing of the fingernails [Cyanosis, Localized] : no localized cyanosis [Petechial Hemorrhages (___cm)] : no petechial hemorrhages [Skin Color & Pigmentation] : normal skin color and pigmentation [] : no rash [No Venous Stasis] : no venous stasis [Skin Lesions] : no skin lesions [No Skin Ulcers] : no skin ulcer [No Xanthoma] : no  xanthoma was observed [Oriented To Time, Place, And Person] : oriented to person, place, and time [Affect] : the affect was normal [Mood] : the mood was normal [No Anxiety] : not feeling anxious [Normal Rate] : normal [Normal S1] : normal S1 [Normal S2] : normal S2 [No Murmur] : no murmurs heard [II] : a grade 2 [2+] : left 2+ [No Abnormalities] : the abdominal aorta was not enlarged and no bruit was heard [No Pitting Edema] : no pitting edema present [S3] : no S3 [S4] : no S4 [Right Carotid Bruit] : no bruit heard over the right carotid [Left Carotid Bruit] : no bruit heard over the left carotid [Right Femoral Bruit] : no bruit heard over the right femoral artery [Left Femoral Bruit] : no bruit heard over the left femoral artery

## 2022-05-26 NOTE — ASSESSMENT
[FreeTextEntry1] : 1. CAD s/p multiple PCI, CCS 1, s/p: 02/14/22: exercise stress echo: EF 64%, aortic sclerosis, + basal inferior hypokinesis post 6.6 METS.\par             - will continue long term dual antiplatelet therapy given numerous PCIs in the past. \par             - will consider pursuing invasive coronary if symptoms persist\par             - discussed with patient importance of seeking immediate medical attention if anginal type chest pain develops\par             \par 2. HTN: BP not at ACC/AHA 2017 guideline target: her PMD switched telmisartan/HCT 80/25 to amlodipine 5\par             - continue labetalol 300mg po bid\par             - increase amlodipine from 5mg po qd to 10mg po qd \par \par 3. Diabetes type II: not controlled, A1c 8.6% (05/20/22)\par             - continue metformin ER 500mg tabs 2 tabs po bid\par             - continue Trulicity 0.75mg/0.5ml sc qweek\par             - discussed with patient therapeutic lifestyle changes to improve glucose metabolism\par             - follow up with endocrinologist, Ed Corbett MD \par \par 4. Dyslipidemia: lipids at goal, LDL 93 (11/03/21)\par             - continue atorvastatin 80mg po daily\par             - discussed with patient therapeutic lifestyle changes to improve lipid metabolism\par \par 5. Carotid artery atherosclerosis: [place] 20-49% stenosis, LICA 1-19% stenosis: \par            - will pursue medical management\par            - will follow with serial carotid artery sonograms \par \par 6. Peripheral artery disease:  b/l PER and PTA segmental occlusions\par            - will follow with bilateral lower extremity arterial sonography\par            - will pursue aggressive medical management\par \par \par An ECG was obtained to evaluate heart rhythm and screen for any underlying cardiac abnormalities. \par \par

## 2022-05-26 NOTE — REASON FOR VISIT
[Other: ____] : [unfilled] [FreeTextEntry1] : Diagnostic Tests:\par -----------------------------------------------\par ECG:\par 05/26/22: sinus rhythm, nonspecific T-wave abnormality. \par 10/26/21: sinus rhythm, normal ECG. \par 10/20/20: NSR, nonspecific T-wave abnormality.\par 01/11/19:  NSR, normal ECG. \par 09/24/18  NSR, DAVID, non-specific ST/T changes. \par 10/18/17: NSR, DAVID, non-specific ST/T changes. \par 11/10/16: NSR, non-specific ST/T changes.\par 02/18/15: NSR, non-specific ST/T changes. \par -----------------------------------------------\par Cath:\par 12/21/11: RCA PCI mid and distal, LCX distal 70%, D2 70%\par 12/18/06: patent stents RCA/LAD/LCX, PCI prox RCA\par 12/12/05: RCA 90%, LM 40%, LAD prox 40%, mid 90%, LCX 90%, EF 60%, PCI LCX/LAD/RCA\par 09/12/05: PCI mid LAD, PCI prox RCA\par 09/09/05: RCA prox 90%, mid 50%,LM 40%, LAD prox 40%, mid 90%, EF 60%, PCI LCx\par ------------------------------------------------\par Echo:\par 11/16/20: EF 67%, aortic sclerosis. \par 06/15/16: EF 65%, grade I diastolic dysfunction, mild MR\par 02/08/11: EF 63%, diastolic dysfunction, trace MR/TR\par -------------------------------------------------\par Stress tests:\par 02/14/22: exercise stress echo: EF 64%, aortic sclerosis, + basal inferior hypokinesis post 6.6 METS.\par 12/03/19: exercise stress echo: EF 68%, aortic sclerosis, 7 METS, + exercise-induced ischemia basal inferior wall, normal ECG and PA pressures. \par 03/07/17: exercise stress echo: EF 65%, 7 METS, normal wall motion and PA pressures, hypertensive BP response. \par 10/174/13: dobutamine stress echo: EF 70%, normal wall motion.\par --------------------------------------------------\par Carotid arteries:\par 05/26/22: sono: [place] 20-49% stenosis, LICA 1-19% stenosis.\par --------------------------------------------------\par Lower extremity arteries:\par 05/26/22: sono: possible b/l PER and PTA segmental occlusions. \par 11/16/20: sono: b/l prox ICA 20-49% stenosis.

## 2022-05-26 NOTE — HISTORY OF PRESENT ILLNESS
[FreeTextEntry1] : Ms. Schulz presents for follow up and management of CAD s/p multi-vessel PCI, HTN, dyslipidemia, and DM2.  On 11/16/20 she had an echocardiogram which revealed an EF of 67% and aortic sclerosis.  On 2/14/22, she had an exercise stress echocardiogram which revealed an EF of 64%, aortic sclerosis, and + basal inferior hypokinesis post 6.6 METS.  Today, 05/26/22, she had bilateral lower extremity arterial sonography which revealed possible bilateral peroneal and posterior tibial artery segmental occlusions with otherwise mild diffuse atherosclerosis. \par

## 2022-09-20 ENCOUNTER — APPOINTMENT (OUTPATIENT)
Dept: HEART AND VASCULAR | Facility: CLINIC | Age: 77
End: 2022-09-20

## 2022-09-20 VITALS
HEART RATE: 69 BPM | DIASTOLIC BLOOD PRESSURE: 75 MMHG | HEIGHT: 62 IN | OXYGEN SATURATION: 98 % | WEIGHT: 127.7 LBS | BODY MASS INDEX: 23.5 KG/M2 | SYSTOLIC BLOOD PRESSURE: 131 MMHG

## 2022-09-20 PROCEDURE — 99214 OFFICE O/P EST MOD 30 MIN: CPT

## 2022-10-26 NOTE — ASSESSMENT
[FreeTextEntry1] : 1. CAD s/p multiple PCI, CCS 1, s/p: 02/14/22: exercise stress echo: EF 64%, aortic sclerosis, + basal inferior hypokinesis post 6.6 METS.\par             - will continue long term dual antiplatelet therapy given numerous PCIs in the past. \par             - will consider pursuing invasive coronary if symptoms persist\par             - discussed with patient importance of seeking immediate medical attention if anginal type chest pain develops\par             \par 2. HTN: BP at ACC/AHA 2017 guideline target: her PMD switched telmisartan/HCT 80/25 to amlodipine 5\par             - continue labetalol 300mg po bid\par             - continue amlodipine 10mg po qd \par \par 3. Diabetes type II: not controlled, A1c 8.6% (05/20/22): had 2 perineal infections with SGLT2-I\par             - continue metformin ER 500mg tabs 2 tabs po bid\par             - continue Trulicity 0.75mg/0.5ml sc qweek\par             - discussed with patient therapeutic lifestyle changes to improve glucose metabolism\par             - follow up with endocrinologist, Ed Corbett MD \par \par 4. Dyslipidemia: lipids at goal, LDL 93 (11/03/21)\par             - continue atorvastatin 80mg po daily\par             - discussed with patient therapeutic lifestyle changes to improve lipid metabolism\par \par 5. Carotid artery atherosclerosis: DALLAS 20-49% stenosis, LICA 1-19% stenosis: \par            - will pursue medical management\par            - will follow with serial carotid artery sonograms \par \par 6. Peripheral artery disease:  b/l PER and PTA segmental occlusions\par            - will follow with bilateral lower extremity arterial sonography\par            - will pursue aggressive medical management\par \par \par

## 2022-10-26 NOTE — ADDENDUM
[FreeTextEntry1] : 10/26/22; Ms. Schulz is scheduled to undergo a colonoscopy with Helen White MD.  She has a history of CAD with multivessel PCI.  She had an exercise stress echocardiogram on 02/14/22 which revealed mild exercise-induced ischemia.  She denies chest pain and is medically optimized.  She may, therefore, proceed with surgery without cardiac contraindications. She may hold aspirin for 5 days prior to the procedure if required.

## 2022-10-26 NOTE — HISTORY OF PRESENT ILLNESS
[FreeTextEntry1] : Ms. Schulz presents for follow up and management of CAD s/p multi-vessel PCI, HTN, dyslipidemia, and DM2.  On 11/16/20 she had an echocardiogram which revealed an EF of 67% and aortic sclerosis.  On 2/14/22, she had an exercise stress echocardiogram which revealed an EF of 64%, aortic sclerosis, and + basal inferior hypokinesis post 6.6 METS.  On 05/26/22, she had bilateral lower extremity arterial sonography which revealed possible bilateral peroneal and posterior tibial artery segmental occlusions with otherwise mild diffuse atherosclerosis. At present, she feels well.  \par

## 2022-10-27 ENCOUNTER — APPOINTMENT (OUTPATIENT)
Dept: HEART AND VASCULAR | Facility: CLINIC | Age: 77
End: 2022-10-27

## 2022-10-27 ENCOUNTER — NON-APPOINTMENT (OUTPATIENT)
Age: 77
End: 2022-10-27

## 2022-10-27 VITALS — SYSTOLIC BLOOD PRESSURE: 127 MMHG | DIASTOLIC BLOOD PRESSURE: 77 MMHG

## 2022-10-27 VITALS
HEIGHT: 62 IN | TEMPERATURE: 94.1 F | HEART RATE: 81 BPM | BODY MASS INDEX: 23.55 KG/M2 | WEIGHT: 128 LBS | OXYGEN SATURATION: 98 % | DIASTOLIC BLOOD PRESSURE: 78 MMHG | SYSTOLIC BLOOD PRESSURE: 139 MMHG

## 2022-10-27 DIAGNOSIS — B37.31 ACUTE CANDIDIASIS OF VULVA AND VAGINA: ICD-10-CM

## 2022-10-27 DIAGNOSIS — Z87.440 PERSONAL HISTORY OF URINARY (TRACT) INFECTIONS: ICD-10-CM

## 2022-10-27 PROCEDURE — 99214 OFFICE O/P EST MOD 30 MIN: CPT | Mod: 25

## 2022-10-27 PROCEDURE — 93000 ELECTROCARDIOGRAM COMPLETE: CPT

## 2022-10-27 PROCEDURE — 93925 LOWER EXTREMITY STUDY: CPT | Mod: 59

## 2022-10-27 RX ORDER — CLOTRIMAZOLE 10 MG/G
1 CREAM TOPICAL AT BEDTIME
Qty: 1 | Refills: 1 | Status: ACTIVE | COMMUNITY
Start: 2022-10-27 | End: 1900-01-01

## 2022-10-27 NOTE — REASON FOR VISIT
[FreeTextEntry1] : Diagnostic Tests:\par -----------------------------------------------\par ECG:\par 10/27/22: sinus rhythm, normal ECG. \par 05/26/22: sinus rhythm, nonspecific T-wave abnormality. \par 10/26/21: sinus rhythm, normal ECG. \par 10/20/20: NSR, nonspecific T-wave abnormality.\par 01/11/19:  NSR, normal ECG. \par 09/24/18  NSR, DAVID, non-specific ST/T changes. \par 10/18/17: NSR, DAVID, non-specific ST/T changes. \par 11/10/16: NSR, non-specific ST/T changes.\par 02/18/15: NSR, non-specific ST/T changes. \par -----------------------------------------------\par Cath:\par 12/21/11: RCA PCI mid and distal, LCX distal 70%, D2 70%\par 12/18/06: patent stents RCA/LAD/LCX, PCI prox RCA\par 12/12/05: RCA 90%, LM 40%, LAD prox 40%, mid 90%, LCX 90%, EF 60%, PCI LCX/LAD/RCA\par 09/12/05: PCI mid LAD, PCI prox RCA\par 09/09/05: RCA prox 90%, mid 50%,LM 40%, LAD prox 40%, mid 90%, EF 60%, PCI LCx\par ------------------------------------------------\par Echo:\par 11/16/20: EF 67%, aortic sclerosis. \par 06/15/16: EF 65%, grade I diastolic dysfunction, mild MR\par 02/08/11: EF 63%, diastolic dysfunction, trace MR/TR\par -------------------------------------------------\par Stress tests:\par 02/14/22: exercise stress echo: EF 64%, aortic sclerosis, + basal inferior hypokinesis post 6.6 METS.\par 12/03/19: exercise stress echo: EF 68%, aortic sclerosis, 7 METS, + exercise-induced ischemia basal inferior wall, normal ECG and PA pressures. \par 03/07/17: exercise stress echo: EF 65%, 7 METS, normal wall motion and PA pressures, hypertensive BP response. \par 10/174/13: dobutamine stress echo: EF 70%, normal wall motion.\par --------------------------------------------------\par Carotid arteries:\par 05/26/22: sono: DALLAS 20-49% stenosis, LICA 1-19% stenosis.\par --------------------------------------------------\par Lower extremity arteries:\par 10/27/22: sono: right mPTA 100% with distal reconstitution, rPER, lPTA,LPER heavily calcified with biphasic flow. \par 05/26/22: sono: possible b/l PER and PTA segmental occlusions. \par 11/16/20: sono: b/l prox ICA 20-49% stenosis.

## 2022-10-27 NOTE — HISTORY OF PRESENT ILLNESS
[FreeTextEntry1] : Ms. Schulz presents for follow up and management of CAD s/p multi-vessel PCI, HTN, dyslipidemia, PAD, and DM2.   On 2/14/22, she had an exercise stress echocardiogram which revealed an EF of 64%, aortic sclerosis, and + basal inferior hypokinesis post 6.6 METS.  At present, she has complaints of bilateral calf claudication to 2 blocks ambulation and OZUNA to 2-3 blocks ambulation.  She denies chest pain.  Today, 10/27/22, she had bilateral lower extremity arterial sonography which revealed right mPTA occlusion with distal reconstitution, and rPER, lPER, and Robert severely calcified vessels with biphasic flow.  \par

## 2022-10-27 NOTE — ASSESSMENT
[FreeTextEntry1] : 1. CAD s/p multiple PCI, CCS 1, s/p: 02/14/22: exercise stress echo: EF 64%, aortic sclerosis, + basal inferior hypokinesis post 6.6 METS.\par             - will continue long term dual antiplatelet therapy given numerous PCIs in the past. \par             - will send for a CCTA given anginal symptoms \par             - discussed with patient importance of seeking immediate medical attention if anginal type chest pain develops\par             \par 2. HTN: BP at ACC/AHA 2017 guideline target: \par             - continue labetalol 300mg po bid\par             - continue amlodipine 10mg po qd \par \par 3. Diabetes type II: not controlled: had 2 perineal infections with SGLT2-I: A1c 9.1% (09/21/22): \par             - continue metformin ER 500mg tabs 2 tabs po bid\par             - continue Trulicity 0.75mg/0.5ml sc q week\par             - discussed with patient therapeutic lifestyle changes to improve glucose metabolism\par             - discussed speaking with her PMD to obtain referral to a new endocrinologist\par \par 4. Dyslipidemia: lipids at goal, LDL 93 (11/03/21)\par             - continue atorvastatin 80mg po daily\par             - discussed with patient therapeutic lifestyle changes to improve lipid metabolism\par             - check lab work today \par \par 5. Carotid artery atherosclerosis: DALLAS 20-49% stenosis, LICA 1-19% stenosis: \par            - will pursue medical management\par            - will follow with serial carotid artery sonograms \par \par 6. Peripheral artery disease: + right mid PTA occlusion with distal reconstitution: \par            - will follow with bilateral lower extremity arterial sonography\par            - will pursue aggressive medical management\par \par \par \par

## 2022-10-28 LAB
ALBUMIN SERPL ELPH-MCNC: 4.6 G/DL
ALP BLD-CCNC: 107 U/L
ALT SERPL-CCNC: 19 U/L
ANION GAP SERPL CALC-SCNC: 14 MMOL/L
AST SERPL-CCNC: 15 U/L
BASOPHILS # BLD AUTO: 0.07 K/UL
BASOPHILS NFR BLD AUTO: 0.7 %
BILIRUB SERPL-MCNC: 0.4 MG/DL
BUN SERPL-MCNC: 11 MG/DL
CALCIUM SERPL-MCNC: 10.6 MG/DL
CHLORIDE SERPL-SCNC: 102 MMOL/L
CHOLEST SERPL-MCNC: 149 MG/DL
CO2 SERPL-SCNC: 25 MMOL/L
CREAT SERPL-MCNC: 0.57 MG/DL
EGFR: 95 ML/MIN/1.73M2
EOSINOPHIL # BLD AUTO: 0.1 K/UL
EOSINOPHIL NFR BLD AUTO: 1 %
ESTIMATED AVERAGE GLUCOSE: 200 MG/DL
GLUCOSE SERPL-MCNC: 204 MG/DL
HBA1C MFR BLD HPLC: 8.6 %
HCT VFR BLD CALC: 39.9 %
HDLC SERPL-MCNC: 51 MG/DL
HGB BLD-MCNC: 12.5 G/DL
IMM GRANULOCYTES NFR BLD AUTO: 0.4 %
LDLC SERPL DIRECT ASSAY-MCNC: 70 MG/DL
LYMPHOCYTES # BLD AUTO: 2.28 K/UL
LYMPHOCYTES NFR BLD AUTO: 22.3 %
MAN DIFF?: NORMAL
MCHC RBC-ENTMCNC: 28.9 PG
MCHC RBC-ENTMCNC: 31.3 GM/DL
MCV RBC AUTO: 92.4 FL
MONOCYTES # BLD AUTO: 0.58 K/UL
MONOCYTES NFR BLD AUTO: 5.7 %
NEUTROPHILS # BLD AUTO: 7.15 K/UL
NEUTROPHILS NFR BLD AUTO: 69.9 %
PLATELET # BLD AUTO: 298 K/UL
POTASSIUM SERPL-SCNC: 4.8 MMOL/L
PROT SERPL-MCNC: 7.8 G/DL
RBC # BLD: 4.32 M/UL
RBC # FLD: 13.6 %
SODIUM SERPL-SCNC: 141 MMOL/L
TRIGL SERPL-MCNC: 250 MG/DL
TSH SERPL-ACNC: 0.53 UIU/ML
WBC # FLD AUTO: 10.22 K/UL

## 2022-10-31 ENCOUNTER — APPOINTMENT (OUTPATIENT)
Dept: CT IMAGING | Facility: CLINIC | Age: 77
End: 2022-10-31

## 2022-10-31 ENCOUNTER — OUTPATIENT (OUTPATIENT)
Dept: OUTPATIENT SERVICES | Facility: HOSPITAL | Age: 77
LOS: 1 days | End: 2022-10-31

## 2022-10-31 ENCOUNTER — RESULT REVIEW (OUTPATIENT)
Age: 77
End: 2022-10-31

## 2022-10-31 ENCOUNTER — NON-APPOINTMENT (OUTPATIENT)
Age: 77
End: 2022-10-31

## 2022-10-31 PROCEDURE — 75574 CT ANGIO HRT W/3D IMAGE: CPT | Mod: 26,MH

## 2022-11-04 VITALS
HEIGHT: 62 IN | RESPIRATION RATE: 18 BRPM | DIASTOLIC BLOOD PRESSURE: 86 MMHG | OXYGEN SATURATION: 100 % | SYSTOLIC BLOOD PRESSURE: 165 MMHG | WEIGHT: 125 LBS | HEART RATE: 63 BPM | TEMPERATURE: 98 F

## 2022-11-04 RX ORDER — CHLORHEXIDINE GLUCONATE 213 G/1000ML
1 SOLUTION TOPICAL ONCE
Refills: 0 | Status: DISCONTINUED | OUTPATIENT
Start: 2022-11-10 | End: 2022-11-11

## 2022-11-04 NOTE — H&P ADULT - ASSESSMENT
77yo Female w/ PMHx of HTN, HLD, PAD, DM T2, and CAD (s/p multi-vessel PCIs) who presented to outpatient cardiologist, Dr. Finkelstein with Class III anginal equivalent symptoms and found with subsequent abnormal CCTA, now presents to [facility] for recommended left heart cardiac catheterization with possible intervention if clinically indicated.    EKG:					  ASA _____				Mallampati class: _________	            Anginal Class: _________    -Allergy Status:   -H/H = *****Pt denies BRBPR, hematuria, hematochezia, melena. Pt given ASA:  and Plavix:     -BUN/Cr = **. EF****. Euvolemic on exam. IV NS @ *****started pre procedure    Sedation Plan:   ? None   ? Moderate    ?  Deep    ?  General Anesthesia   Patient Is Suitable Candidate For Sedation?     ? Yes   ? No   ? Not Applicable     Risks & benefits of procedure and sedation and risks and benefits for the alternative therapy have been explained to the patient in layman’s terms including but not limited to: allergic reaction, bleeding, infection, arrhythmia, respiratory compromise, renal and vascular compromise, limb damage, MI, CVA, emergent CABG/Vascular Surgery and death. Informed consent obtained and in chart.   75yo Female, SEVERE SHELLFISH ALLERGY, w/ PMHx of HTN, HLD, PAD, DM T2, and CAD (s/p multi-vessel PCIs) who presented to outpatient cardiologist, Dr. Finkelsteinwith Class III anginal equivalent symptoms and found with subsequent abnormal CCTA, now presents to [facility] for recommended left heart cardiac catheterization with possible intervention if clinically indicated.    EKGSB @ 58bpm, no acute ischemic changes  				  ASA:  III			Mallampati class: II            Anginal Class 3 anginal equivalent symptoms    -Allergy Status: Severe Shellfish Allergy (Lobster: Itching/Redness/Swelling)****Premedicated with  -H/H = Stable. Pt denies BRBPR, hematuria, hematochezia, melena. Pt took home ASA 81mg and Plavix 75mg at home prior to the Cath Lab  -BUN/Cr = WNL, EF unknown.   Euvolemic on exam. IV NS @ 75cc x 2 hours only 2/2 to elevated BP 160s.    -Magnesium 1.5, repleted with MgOx 400mg PO x 1 dose    Sedation Plan: Moderate     Patient Is Suitable Candidate For Sedation:  Yes    Risks & benefits of procedure and sedation and risks and benefits for the alternative therapy have been explained to the patient in layman’s terms including but not limited to: allergic reaction, bleeding, infection, arrhythmia, respiratory compromise, renal and vascular compromise, limb damage, MI, CVA, emergent CABG/Vascular Surgery and death. Informed consent obtained and in chart.   77yo Female, SEVERE SHELLFISH ALLERGY, w/ PMHx of HTN, HLD, PAD, DM T2, and CAD (s/p multi-vessel PCIs) who presented to outpatient cardiologist, Dr. Finkelsteinwith Class III anginal equivalent symptoms and found with subsequent abnormal CCTA, now presents to [facility] for recommended left heart cardiac catheterization with possible intervention if clinically indicated.    EKGSB @ 58bpm, no acute ischemic changes  				  ASA:  III			Mallampati class: II            Anginal Class 3 anginal equivalent symptoms    -Allergy Status: Severe Shellfish Allergy (Lobster: Itching/Redness/Swelling).  Pt premedicated prior to Cath with Solu-Cortef 200mg IV x 1 dose and Benadryl 50mg IVP x 1 dose to be given on cath lab table  -H/H = Stable. Pt denies BRBPR, hematuria, hematochezia, melena. Pt took home ASA 81mg and Plavix 75mg at home prior to the Cath Lab  -BUN/Cr = WNL, EF unknown.   Euvolemic on exam. IV NS @ 75cc x 2 hours only 2/2 to elevated BP 160s.    -Magnesium 1.5, repleted with MgOx 400mg PO x 1 dose    Sedation Plan: Moderate     Patient Is Suitable Candidate For Sedation:  Yes    Risks & benefits of procedure and sedation and risks and benefits for the alternative therapy have been explained to the patient in layman’s terms including but not limited to: allergic reaction, bleeding, infection, arrhythmia, respiratory compromise, renal and vascular compromise, limb damage, MI, CVA, emergent CABG/Vascular Surgery and death. Informed consent obtained and in chart.

## 2022-11-04 NOTE — H&P ADULT - NSICDXPASTMEDICALHX_GEN_ALL_CORE_FT
PAST MEDICAL HISTORY:  CAD (coronary artery disease)     DM (diabetes mellitus)     HLD (hyperlipidemia)     PAD (peripheral artery disease)

## 2022-11-04 NOTE — H&P ADULT - NSHPLABSRESULTS_GEN_ALL_CORE
11.8   9.04  )-----------( 279      ( 10 Nov 2022 10:57 )             36.4       11-10    137  |  101  |  9   ----------------------------<  182<H>  4.3   |  28  |  0.56    Ca    10.2      10 Nov 2022 11:29  Mg     1.5     11-10    TPro  8.1  /  Alb  4.8  /  TBili  0.4  /  DBili  x   /  AST  19  /  ALT  19  /  AlkPhos  95  11-10      PT/INR - ( 10 Nov 2022 10:57 )   PT: 12.8 sec;   INR: 1.07          PTT - ( 10 Nov 2022 10:57 )  PTT:30.1 sec    CARDIAC MARKERS ( 10 Nov 2022 11:29 )  x     / x     / 65 U/L / x     / 1.4 ng/mL

## 2022-11-04 NOTE — H&P ADULT - HISTORY OF PRESENT ILLNESS
CARDIOLOGIST: Dr. Finkelstein   COVID:   PHARMACY:   ESCORT:     Pt is 77yo Female w/ PMHx of HTN, HLD, PAD, DM T2, and CAD (s/p multi-vessel PCIs) who presented to outpatient cardiologist, Dr. Finkelstein, for             Cardiac Cath Hx:  12/2011: PCI mRCA/dRCA; dLCx 70%, D2 70% CARDIOLOGIST: Dr. Finkelstein   COVID:   PHARMACY:   ESCORT:     Pt is 77yo Female w/ PMHx of HTN, HLD, PAD, DM T2, and CAD (s/p multi-vessel PCIs) who presented to outpatient cardiologist, Dr. Finkelstein, for evaluation. Patient endorsing significant OZUNA w/ ambulation of 2-3 blocks, as well as B/L LE claudications symptoms (PAD on arterial duplex - plan for peripheral intervention after cardiac catheterization). Pt denies CP, dizziness/syncope, palpitations, diaphoresis, abdominal pain, N/V, fever/chills, LE edema, orthopnea, PND.      Stress Echo (2/14/22): basal inferior hypokinesis.   CCTA (10/31/22): LM < 25%, pLAD moderate ISR, D1/D2 mild, pLCx patent stent, OM1 normal, OM2 moderate, RI normal, RCA severe ISR.    In light of patient's risk factors, extensive CAD history, CCS Class III anginal equivalent symptoms, and abnormal CCTA, patient now presents to [facility] for cardiac catheterization with possible intervention if clinically indicated.         Cardiac Cath Hx (per MD note):  12/2011: PCI mRCA/dRCA; dLCx 70%, D2 70%.   12/2006: PCI pRCA; patent stent RCA/LAD/LCx.   12/2005: PCI LCx/LAD/RCA.   9/2005: PCI mLAD/pRCA.   2005: PCI LCx.  CARDIOLOGIST: Dr. Finkelstein   COVID: Negative 11/8/22 at [facility]  PHARMACY: [place] [address]  ESCORT: Whitney Oleary (daughter)    Pt is 77yo Female, SEVERE SHELLFISH ALLERGY, w/ PMHx of HTN, HLD, PAD, DM T2, and CAD (s/p multi-vessel PCIs) who presented to outpatient cardiologist, Dr. Finkelstein, for evaluation. Patient endorsing significant OZUNA w/ ambulation of 2-3 blocks, as well as B/L LE claudications symptoms (PAD on arterial duplex - plan for peripheral intervention after cardiac catheterization). Pt denies CP, dizziness/syncope, palpitations, diaphoresis, abdominal pain, N/V, fever/chills, LE edema, orthopnea, PND.      Stress Echo (2/14/22): basal inferior hypokinesis.   CCTA (10/31/22): LM < 25%, pLAD moderate ISR, D1/D2 mild, pLCx patent stent, OM1 normal, OM2 moderate, RI normal, RCA severe ISR.    In light of patient's risk factors, extensive CAD history, CCS Class III anginal equivalent symptoms, and abnormal CCTA, patient now presents to [facility] for cardiac catheterization with possible intervention if clinically indicated.         Cardiac Cath Hx (per MD note):  12/2011: PCI mRCA/dRCA; dLCx 70%, D2 70%.   12/2006: PCI pRCA; patent stent RCA/LAD/LCx.   12/2005: PCI LCx/LAD/RCA.   9/2005: PCI mLAD/pRCA.   2005: PCI LCx.

## 2022-11-10 ENCOUNTER — INPATIENT (INPATIENT)
Facility: HOSPITAL | Age: 77
LOS: 0 days | Discharge: HOME | DRG: 247 | End: 2022-11-11
Attending: INTERNAL MEDICINE | Admitting: INTERNAL MEDICINE
Payer: MEDICARE

## 2022-11-10 ENCOUNTER — TRANSCRIPTION ENCOUNTER (OUTPATIENT)
Age: 77
End: 2022-11-10

## 2022-11-10 LAB
A1C WITH ESTIMATED AVERAGE GLUCOSE RESULT: 8.5 % — HIGH (ref 4–5.6)
ALBUMIN SERPL ELPH-MCNC: 4.8 G/DL — SIGNIFICANT CHANGE UP (ref 3.3–5)
ALP SERPL-CCNC: 95 U/L — SIGNIFICANT CHANGE UP (ref 40–120)
ALT FLD-CCNC: 19 U/L — SIGNIFICANT CHANGE UP (ref 10–45)
ANION GAP SERPL CALC-SCNC: 8 MMOL/L — SIGNIFICANT CHANGE UP (ref 5–17)
APTT BLD: 30.1 SEC — SIGNIFICANT CHANGE UP (ref 27.5–35.5)
AST SERPL-CCNC: 19 U/L — SIGNIFICANT CHANGE UP (ref 10–40)
BASOPHILS # BLD AUTO: 0.07 K/UL — SIGNIFICANT CHANGE UP (ref 0–0.2)
BASOPHILS NFR BLD AUTO: 0.8 % — SIGNIFICANT CHANGE UP (ref 0–2)
BILIRUB SERPL-MCNC: 0.4 MG/DL — SIGNIFICANT CHANGE UP (ref 0.2–1.2)
BUN SERPL-MCNC: 9 MG/DL — SIGNIFICANT CHANGE UP (ref 7–23)
CALCIUM SERPL-MCNC: 10.2 MG/DL — SIGNIFICANT CHANGE UP (ref 8.4–10.5)
CHLORIDE SERPL-SCNC: 101 MMOL/L — SIGNIFICANT CHANGE UP (ref 96–108)
CHOLEST SERPL-MCNC: 133 MG/DL — SIGNIFICANT CHANGE UP
CK MB CFR SERPL CALC: 1.4 NG/ML — SIGNIFICANT CHANGE UP (ref 0–6.7)
CK SERPL-CCNC: 65 U/L — SIGNIFICANT CHANGE UP (ref 25–170)
CO2 SERPL-SCNC: 28 MMOL/L — SIGNIFICANT CHANGE UP (ref 22–31)
CREAT SERPL-MCNC: 0.56 MG/DL — SIGNIFICANT CHANGE UP (ref 0.5–1.3)
EGFR: 96 ML/MIN/1.73M2 — SIGNIFICANT CHANGE UP
EOSINOPHIL # BLD AUTO: 0.16 K/UL — SIGNIFICANT CHANGE UP (ref 0–0.5)
EOSINOPHIL NFR BLD AUTO: 1.8 % — SIGNIFICANT CHANGE UP (ref 0–6)
ESTIMATED AVERAGE GLUCOSE: 197 MG/DL — HIGH (ref 68–114)
GLUCOSE BLDC GLUCOMTR-MCNC: 167 MG/DL — HIGH (ref 70–99)
GLUCOSE BLDC GLUCOMTR-MCNC: 213 MG/DL — HIGH (ref 70–99)
GLUCOSE BLDC GLUCOMTR-MCNC: 228 MG/DL — HIGH (ref 70–99)
GLUCOSE SERPL-MCNC: 182 MG/DL — HIGH (ref 70–99)
HCT VFR BLD CALC: 36.4 % — SIGNIFICANT CHANGE UP (ref 34.5–45)
HDLC SERPL-MCNC: 46 MG/DL — LOW
HGB BLD-MCNC: 11.8 G/DL — SIGNIFICANT CHANGE UP (ref 11.5–15.5)
IMM GRANULOCYTES NFR BLD AUTO: 0.1 % — SIGNIFICANT CHANGE UP (ref 0–0.9)
INR BLD: 1.07 — SIGNIFICANT CHANGE UP (ref 0.88–1.16)
ISTAT ACTK (ACTIVATED CLOTTING TIME KAOLIN): 219 SEC — HIGH (ref 74–137)
ISTAT ACTK (ACTIVATED CLOTTING TIME KAOLIN): 237 SEC — HIGH (ref 74–137)
ISTAT ACTK (ACTIVATED CLOTTING TIME KAOLIN): 242 SEC — HIGH (ref 74–137)
ISTAT ACTK (ACTIVATED CLOTTING TIME KAOLIN): 306 SEC — HIGH (ref 74–137)
ISTAT ACTK (ACTIVATED CLOTTING TIME KAOLIN): 358 SEC — HIGH (ref 74–137)
LIPID PNL WITH DIRECT LDL SERPL: 59 MG/DL — SIGNIFICANT CHANGE UP
LYMPHOCYTES # BLD AUTO: 2.62 K/UL — SIGNIFICANT CHANGE UP (ref 1–3.3)
LYMPHOCYTES # BLD AUTO: 29 % — SIGNIFICANT CHANGE UP (ref 13–44)
MAGNESIUM SERPL-MCNC: 1.5 MG/DL — LOW (ref 1.6–2.6)
MCHC RBC-ENTMCNC: 29.1 PG — SIGNIFICANT CHANGE UP (ref 27–34)
MCHC RBC-ENTMCNC: 32.4 GM/DL — SIGNIFICANT CHANGE UP (ref 32–36)
MCV RBC AUTO: 89.7 FL — SIGNIFICANT CHANGE UP (ref 80–100)
MONOCYTES # BLD AUTO: 0.57 K/UL — SIGNIFICANT CHANGE UP (ref 0–0.9)
MONOCYTES NFR BLD AUTO: 6.3 % — SIGNIFICANT CHANGE UP (ref 2–14)
NEUTROPHILS # BLD AUTO: 5.61 K/UL — SIGNIFICANT CHANGE UP (ref 1.8–7.4)
NEUTROPHILS NFR BLD AUTO: 62 % — SIGNIFICANT CHANGE UP (ref 43–77)
NON HDL CHOLESTEROL: 87 MG/DL — SIGNIFICANT CHANGE UP
NRBC # BLD: 0 /100 WBCS — SIGNIFICANT CHANGE UP (ref 0–0)
PLATELET # BLD AUTO: 279 K/UL — SIGNIFICANT CHANGE UP (ref 150–400)
POTASSIUM SERPL-MCNC: 4.3 MMOL/L — SIGNIFICANT CHANGE UP (ref 3.5–5.3)
POTASSIUM SERPL-SCNC: 4.3 MMOL/L — SIGNIFICANT CHANGE UP (ref 3.5–5.3)
PROT SERPL-MCNC: 8.1 G/DL — SIGNIFICANT CHANGE UP (ref 6–8.3)
PROTHROM AB SERPL-ACNC: 12.8 SEC — SIGNIFICANT CHANGE UP (ref 10.5–13.4)
RBC # BLD: 4.06 M/UL — SIGNIFICANT CHANGE UP (ref 3.8–5.2)
RBC # FLD: 13.2 % — SIGNIFICANT CHANGE UP (ref 10.3–14.5)
SODIUM SERPL-SCNC: 137 MMOL/L — SIGNIFICANT CHANGE UP (ref 135–145)
TRIGL SERPL-MCNC: 138 MG/DL — SIGNIFICANT CHANGE UP
WBC # BLD: 9.04 K/UL — SIGNIFICANT CHANGE UP (ref 3.8–10.5)
WBC # FLD AUTO: 9.04 K/UL — SIGNIFICANT CHANGE UP (ref 3.8–10.5)

## 2022-11-10 PROCEDURE — 99153 MOD SED SAME PHYS/QHP EA: CPT

## 2022-11-10 PROCEDURE — 92978 ENDOLUMINL IVUS OCT C 1ST: CPT | Mod: 26,LD

## 2022-11-10 PROCEDURE — 92921: CPT | Mod: LD

## 2022-11-10 PROCEDURE — 99152 MOD SED SAME PHYS/QHP 5/>YRS: CPT

## 2022-11-10 PROCEDURE — 93458 L HRT ARTERY/VENTRICLE ANGIO: CPT | Mod: 26,59

## 2022-11-10 PROCEDURE — 92928 PRQ TCAT PLMT NTRAC ST 1 LES: CPT | Mod: LD

## 2022-11-10 PROCEDURE — 0715T: CPT

## 2022-11-10 PROCEDURE — 93010 ELECTROCARDIOGRAM REPORT: CPT

## 2022-11-10 RX ORDER — INSULIN LISPRO 100/ML
VIAL (ML) SUBCUTANEOUS ONCE
Refills: 0 | Status: COMPLETED | OUTPATIENT
Start: 2022-11-10 | End: 2022-11-10

## 2022-11-10 RX ORDER — DEXTROSE 50 % IN WATER 50 %
12.5 SYRINGE (ML) INTRAVENOUS ONCE
Refills: 0 | Status: DISCONTINUED | OUTPATIENT
Start: 2022-11-10 | End: 2022-11-11

## 2022-11-10 RX ORDER — METFORMIN HYDROCHLORIDE 850 MG/1
1 TABLET ORAL
Qty: 0 | Refills: 0 | DISCHARGE

## 2022-11-10 RX ORDER — AMLODIPINE BESYLATE 2.5 MG/1
10 TABLET ORAL DAILY
Refills: 0 | Status: DISCONTINUED | OUTPATIENT
Start: 2022-11-10 | End: 2022-11-11

## 2022-11-10 RX ORDER — DEXTROSE 50 % IN WATER 50 %
25 SYRINGE (ML) INTRAVENOUS ONCE
Refills: 0 | Status: DISCONTINUED | OUTPATIENT
Start: 2022-11-10 | End: 2022-11-11

## 2022-11-10 RX ORDER — AMLODIPINE BESYLATE 2.5 MG/1
1 TABLET ORAL
Qty: 0 | Refills: 0 | DISCHARGE

## 2022-11-10 RX ORDER — LATANOPROST 0.05 MG/ML
1 SOLUTION/ DROPS OPHTHALMIC; TOPICAL
Qty: 0 | Refills: 0 | DISCHARGE

## 2022-11-10 RX ORDER — ASPIRIN/CALCIUM CARB/MAGNESIUM 324 MG
81 TABLET ORAL DAILY
Refills: 0 | Status: DISCONTINUED | OUTPATIENT
Start: 2022-11-11 | End: 2022-11-11

## 2022-11-10 RX ORDER — HYDROCORTISONE 20 MG
200 TABLET ORAL ONCE
Refills: 0 | Status: COMPLETED | OUTPATIENT
Start: 2022-11-10 | End: 2022-11-10

## 2022-11-10 RX ORDER — INSULIN LISPRO 100/ML
VIAL (ML) SUBCUTANEOUS ONCE
Refills: 0 | Status: COMPLETED | OUTPATIENT
Start: 2022-11-10 | End: 2022-11-11

## 2022-11-10 RX ORDER — HYDRALAZINE HCL 50 MG
10 TABLET ORAL ONCE
Refills: 0 | Status: COMPLETED | OUTPATIENT
Start: 2022-11-10 | End: 2022-11-10

## 2022-11-10 RX ORDER — INFLUENZA VIRUS VACCINE 15; 15; 15; 15 UG/.5ML; UG/.5ML; UG/.5ML; UG/.5ML
0.7 SUSPENSION INTRAMUSCULAR ONCE
Refills: 0 | Status: DISCONTINUED | OUTPATIENT
Start: 2022-11-10 | End: 2022-11-11

## 2022-11-10 RX ORDER — DEXTROSE 50 % IN WATER 50 %
15 SYRINGE (ML) INTRAVENOUS ONCE
Refills: 0 | Status: DISCONTINUED | OUTPATIENT
Start: 2022-11-10 | End: 2022-11-11

## 2022-11-10 RX ORDER — SODIUM CHLORIDE 9 MG/ML
250 INJECTION INTRAMUSCULAR; INTRAVENOUS; SUBCUTANEOUS ONCE
Refills: 0 | Status: DISCONTINUED | OUTPATIENT
Start: 2022-11-10 | End: 2022-11-10

## 2022-11-10 RX ORDER — GLUCAGON INJECTION, SOLUTION 0.5 MG/.1ML
1 INJECTION, SOLUTION SUBCUTANEOUS ONCE
Refills: 0 | Status: DISCONTINUED | OUTPATIENT
Start: 2022-11-10 | End: 2022-11-11

## 2022-11-10 RX ORDER — LABETALOL HCL 100 MG
1 TABLET ORAL
Qty: 0 | Refills: 0 | DISCHARGE

## 2022-11-10 RX ORDER — CHOLECALCIFEROL (VITAMIN D3) 125 MCG
1 CAPSULE ORAL
Qty: 0 | Refills: 0 | DISCHARGE

## 2022-11-10 RX ORDER — SODIUM CHLORIDE 9 MG/ML
500 INJECTION INTRAMUSCULAR; INTRAVENOUS; SUBCUTANEOUS
Refills: 0 | Status: DISCONTINUED | OUTPATIENT
Start: 2022-11-10 | End: 2022-11-11

## 2022-11-10 RX ORDER — MAGNESIUM OXIDE 400 MG ORAL TABLET 241.3 MG
400 TABLET ORAL ONCE
Refills: 0 | Status: COMPLETED | OUTPATIENT
Start: 2022-11-10 | End: 2022-11-10

## 2022-11-10 RX ORDER — ACETAMINOPHEN 500 MG
650 TABLET ORAL ONCE
Refills: 0 | Status: COMPLETED | OUTPATIENT
Start: 2022-11-10 | End: 2022-11-10

## 2022-11-10 RX ORDER — SODIUM CHLORIDE 9 MG/ML
1000 INJECTION, SOLUTION INTRAVENOUS
Refills: 0 | Status: DISCONTINUED | OUTPATIENT
Start: 2022-11-10 | End: 2022-11-11

## 2022-11-10 RX ORDER — CLOPIDOGREL BISULFATE 75 MG/1
75 TABLET, FILM COATED ORAL DAILY
Refills: 0 | Status: DISCONTINUED | OUTPATIENT
Start: 2022-11-11 | End: 2022-11-11

## 2022-11-10 RX ORDER — SODIUM CHLORIDE 9 MG/ML
500 INJECTION INTRAMUSCULAR; INTRAVENOUS; SUBCUTANEOUS
Refills: 0 | Status: DISCONTINUED | OUTPATIENT
Start: 2022-11-10 | End: 2022-11-10

## 2022-11-10 RX ORDER — DIPHENHYDRAMINE HCL 50 MG
50 CAPSULE ORAL ONCE
Refills: 0 | Status: DISCONTINUED | OUTPATIENT
Start: 2022-11-10 | End: 2022-11-11

## 2022-11-10 RX ORDER — DULAGLUTIDE 4.5 MG/.5ML
0 INJECTION, SOLUTION SUBCUTANEOUS
Qty: 0 | Refills: 0 | DISCHARGE

## 2022-11-10 RX ORDER — LABETALOL HCL 100 MG
300 TABLET ORAL EVERY 12 HOURS
Refills: 0 | Status: DISCONTINUED | OUTPATIENT
Start: 2022-11-10 | End: 2022-11-11

## 2022-11-10 RX ORDER — ATORVASTATIN CALCIUM 80 MG/1
80 TABLET, FILM COATED ORAL DAILY
Refills: 0 | Status: DISCONTINUED | OUTPATIENT
Start: 2022-11-10 | End: 2022-11-11

## 2022-11-10 RX ADMIN — Medication 10 MILLIGRAM(S): at 21:09

## 2022-11-10 RX ADMIN — Medication 2: at 18:02

## 2022-11-10 RX ADMIN — Medication 650 MILLIGRAM(S): at 22:30

## 2022-11-10 RX ADMIN — ATORVASTATIN CALCIUM 80 MILLIGRAM(S): 80 TABLET, FILM COATED ORAL at 21:10

## 2022-11-10 RX ADMIN — MAGNESIUM OXIDE 400 MG ORAL TABLET 400 MILLIGRAM(S): 241.3 TABLET ORAL at 12:55

## 2022-11-10 RX ADMIN — SODIUM CHLORIDE 75 MILLILITER(S): 9 INJECTION INTRAMUSCULAR; INTRAVENOUS; SUBCUTANEOUS at 12:14

## 2022-11-10 RX ADMIN — Medication 200 MILLIGRAM(S): at 12:55

## 2022-11-10 RX ADMIN — Medication 650 MILLIGRAM(S): at 21:10

## 2022-11-10 NOTE — DISCHARGE NOTE PROVIDER - NSDCCPCAREPLAN_GEN_ALL_CORE_FT
PRINCIPAL DISCHARGE DIAGNOSIS  Diagnosis: CAD (coronary artery disease)  Assessment and Plan of Treatment: You have a diagnosis of coronary artery disease and received a stent to your left anterior descending coronary artery. You have been started on Aspirin 81mg daily and Plavix (Clopidogrel) 75mg daily. You MUST continue taking the daily Aspirin and Plavix to ensure your stent does not close. DO NOT STOP THESE MEDICATIONS FOR ANY REASON UNLESS OTHERWISE INDICATED BY YOUR CARDIOLOGIST BECAUSE THIS WILL PUT YOU AT RISK FOR A HEART ATTACK. You should refrain from strenuous activity and heavy lifting for 1 week. Please make a follow up appointment with your cardiologist within 1-2 weeks of your discharge. All of your prescriptions have been sent electronically to your pharmacy.  The catheter from your wrist was removed and bleeding was stopped by manual pressure.  Wash the site daily with soap and water.  There is no need to put on a bandage.      Call the Interventional Cardiology and Vascular Team at [phone] or [phone] if any of following occur pertaining to your vascular access site: Bleeding or hematoma formation (collection of blood under the skin), drainage or redness at the puncture site, numbness, decrease in strength, coolness or pale coloration of skin of the leg or hand.       PRINCIPAL DISCHARGE DIAGNOSIS  Diagnosis: CAD (coronary artery disease)  Assessment and Plan of Treatment: You have a diagnosis of coronary artery disease and received a stent to your left anterior descending coronary artery. You have been started on Aspirin 81mg daily and Plavix (Clopidogrel) 75mg daily. You MUST continue taking the daily Aspirin and Plavix to ensure your stent does not close. DO NOT STOP THESE MEDICATIONS FOR ANY REASON UNLESS OTHERWISE INDICATED BY YOUR CARDIOLOGIST BECAUSE THIS WILL PUT YOU AT RISK FOR A HEART ATTACK. You should refrain from strenuous activity and heavy lifting for 1 week. Please make a follow up appointment with your cardiologist within 1-2 weeks of your discharge. All of your prescriptions have been sent electronically to your pharmacy.  The catheter from your wrist was removed and bleeding was stopped by manual pressure.  Wash the site daily with soap and water.  There is no need to put on a bandage.      Call the Interventional Cardiology and Vascular Team at [phone] or [phone] if any of following occur pertaining to your vascular access site: Bleeding or hematoma formation (collection of blood under the skin), drainage or redness at the puncture site, numbness, decrease in strength, coolness or pale coloration of skin of the leg or hand.  We have provided you with a prescription for cardiac rehab which is medically supervised exercise program for your heart and has been shown to improve the quantity and quality of life of people with heart disease like yours. You should attend cardiac rehab 3 times per week for 12 weeks. We have provided you with a list of nearby facilities. Please call your insurance carrier to determine which of these facilities are covered under your plan.         PRINCIPAL DISCHARGE DIAGNOSIS  Diagnosis: CAD (coronary artery disease)  Assessment and Plan of Treatment: You have a diagnosis of coronary artery disease and received a stent to your left anterior descending coronary artery. You have been started on Aspirin 81mg daily and Plavix (Clopidogrel) 75mg daily. You MUST continue taking the daily Aspirin and Plavix to ensure your stent does not close. DO NOT STOP THESE MEDICATIONS FOR ANY REASON UNLESS OTHERWISE INDICATED BY YOUR CARDIOLOGIST BECAUSE THIS WILL PUT YOU AT RISK FOR A HEART ATTACK. You should refrain from strenuous activity and heavy lifting for 1 week. Please make a follow up appointment with your cardiologist within 1-2 weeks of your discharge. All of your prescriptions have been sent electronically to your pharmacy.  The catheter from your wrist was removed and bleeding was stopped by manual pressure.  Wash the site daily with soap and water.  There is no need to put on a bandage.      Call the Interventional Cardiology and Vascular Team at [phone] or [phone] if any of following occur pertaining to your vascular access site: Bleeding or hematoma formation (collection of blood under the skin), drainage or redness at the puncture site, numbness, decrease in strength, coolness or pale coloration of skin of the leg or hand.  We have provided you with a prescription for cardiac rehab which is medically supervised exercise program for your heart and has been shown to improve the quantity and quality of life of people with heart disease like yours. You should attend cardiac rehab 3 times per week for 12 weeks. We have provided you with a list of nearby facilities. Please call your insurance carrier to determine which of these facilities are covered under your plan.        SECONDARY DISCHARGE DIAGNOSES  Diagnosis: HTN (hypertension)  Assessment and Plan of Treatment: Hypertension, commonly called high blood pressure, is when the force of blood pumping through your arteries is too strong. Hypertension forces your heart to work harder to pump blood. Your arteries may become narrow or stiff. Having untreated or uncontrolled hypertension for a long period of time can cause heart attack, stroke, kidney disease, and other problems. If started on a medication, take exactly as prescribed by your health care professional. Maintain a healthy lifestyle and follow up with your primary care physician.  - Please continue to take home meds as directed.    Diagnosis: DM (diabetes mellitus)  Assessment and Plan of Treatment: •	If you are a diabetic and you take Metformin: DO NOT TAKE your Metformin for two days. This medication can interact with the contrast used during your procedure therefore we want to ensure the contrast has left your body prior to you restarting your Metformin.   o	Make sure you monitor your finger sticks and diet while you are not taking your Metformin!      Diagnosis: HLD (hyperlipidemia)  Assessment and Plan of Treatment: Please continue to take Lipitor 80 mg daily.

## 2022-11-10 NOTE — DISCHARGE NOTE PROVIDER - NSDCFUSCHEDAPPT_GEN_ALL_CORE_FT
[facility]  [facility] [address]  Scheduled Appointment: 12/29/2022    [facility]  [facility] [address]  Scheduled Appointment: 01/03/2023

## 2022-11-10 NOTE — DISCHARGE NOTE PROVIDER - NSDCMRMEDTOKEN_GEN_ALL_CORE_FT
amLODIPine 10 mg oral tablet: 1 tab(s) orally once a day  Aspirin Enteric Coated 81 mg oral delayed release tablet: 1 tab(s) orally once a day  atorvastatin 80 mg oral tablet: 1 tab(s) orally once a day  clopidogrel 75 mg oral tablet: 1 tab(s) orally once a day  labetalol 300 mg oral tablet: 1 tab(s) orally 2 times a day  latanoprost 0.005% ophthalmic solution: 1 drop(s) to each affected eye once a day (in the evening)  metFORMIN 1000 mg oral tablet, extended release: 1 tab(s) orally 2 times a day  nitroglycerin 0.4 mg sublingual tablet: 1 tab(s) sublingual every 5 minutes, As Needed  Trulicity Pen 0.75 mg/0.5 mL subcutaneous solution:   Vitamin D3 25 mcg (1000 intl units) oral tablet: 1 tab(s) orally once a day  Xalatan 0.005% ophthalmic solution: 1 drop(s) to each affected eye once a day (in the evening)   amLODIPine 10 mg oral tablet: 1 tab(s) orally once a day  Aspirin Enteric Coated 81 mg oral delayed release tablet: 1 tab(s) orally once a day  atorvastatin 80 mg oral tablet: 1 tab(s) orally once a day  cardiac rehab: We have provided you with a prescription for cardiac rehab which is medically supervised exercise program for your heart and has been shown to improve the quantity and quality of life of people with heart disease like yours. You should attend cardiac rehab 3 times per week for 12 weeks. We have provided you with a list of nearby facilities. Please call your insurance carrier to determine which of these facilities are covered under your plan.    Cardiologist: Dr. Finkelstein  clopidogrel 75 mg oral tablet: 1 tab(s) orally once a day  labetalol 300 mg oral tablet: 1 tab(s) orally 2 times a day  latanoprost 0.005% ophthalmic solution: 1 drop(s) to each affected eye once a day (in the evening)  metFORMIN 1000 mg oral tablet, extended release: 1 tab(s) orally 2 times a day  Trulicity Pen 0.75 mg/0.5 mL subcutaneous solution:   Vitamin D3 25 mcg (1000 intl units) oral tablet: 1 tab(s) orally once a day  Xalatan 0.005% ophthalmic solution: 1 drop(s) to each affected eye once a day (in the evening)

## 2022-11-10 NOTE — DISCHARGE NOTE PROVIDER - CARE PROVIDER_API CALL
Krzysztof Callaway)  Cardiology  [address] [place] [address], 3rd floor (between [address]  [place], [place] [id number]  Phone: [phone]  Fax: [phone]  Follow Up Time: 2 weeks

## 2022-11-10 NOTE — DISCHARGE NOTE PROVIDER - HOSPITAL COURSE
INCOMPLETE     75 y/o female w/ SHELLFISH ALLERGY (SEVERE) and PMHx HTN, HLD, Type II DM, CAD, and CAD (s/p multiple prior PCI's) who presented to outpatient cardiologist, Dr. Finkelstein, endorsing OZUNA w/ ambulation of 2-3 blocks. Patient also endorsed bilateral lower extremity claudication symptoms at that time, and as per MD note was found to have PAD on arterial duplex and will be planned for peripheral intervention at a later time. Patient denied any dizziness, diaphoresis, syncope, chest pain, palpitations, abdominal pain, nausea/vomiting, melena, LE edema, fever, chills, and cough. Stress Echocardiogram (02/14/2022) showed basal inferior hypokinesis. CCTA (10/31/2022) revealed LM <25%, proximal LAD moderate ISR, D1/D2 mild disease, proximal LCx patent stent, OM1 normal, OM2 moderate disease, RI normal, and RCA severe ISR. In light of patient's risk factors, CCS Class III anginal equivalent symptoms, and abnormal CCTA and prior Stress Echocardiogram results, patient presented for cardiac catheterization w/ possible intervention if clinically indicated. Patient is now s/p cardiac catheterization w/ IVLT/Cutting Balloon/RAMANI mid LAD (80% ISR at D1 bifurcation and tandem lesion in midsegment, 75% calcified), and other findings of moderate diffuse disease distal LAD, proximal LCx patent stent (w/ mild ISR), % chronc total occlusion in stent restenosis w/ L-R collaterals, and LM normal; EDP 7 mmHg and no EF measured. Left radial access was used and radial band eventually was removed appropriately and w/o complications.     Patient was seen and examined at bedside on ______ and _______. Radial access site remained stable. Labs were reviewed. Repeat was w/o acute ischemic changes and no significant events were noted overnight on telemetry. Patient has now been medically clear for discharge as per  _________. Patient was provided w/ appropriate discharge instructions including medication regimen, access site management, and follow up. Any prescriptions the patient required refills on upon discharge have been e-submitted to patient's preferred pharmacy.     VS Stable  Gen: NAD, A&O x3  Cards: RRR, clear S1 and S2 without murmur  Pulm: CTA B/L without w/r/r  Left Radial: No hematoma or ooze, peripheral pulses 2+ B/L  Abd: soft, NT  Ext: no LE edema or ulcerations B/L     77 y/o female w/ SHELLFISH ALLERGY (SEVERE) and PMHx HTN, HLD, Type II DM, CAD, and CAD (s/p multiple prior PCI's) who presented to outpatient cardiologist, Dr. Finkelstein, endorsing OZUNA w/ ambulation of 2-3 blocks. Patient also endorsed bilateral lower extremity claudication symptoms at that time, and as per MD note was found to have PAD on arterial duplex and will be planned for peripheral intervention at a later time. Patient denied any dizziness, diaphoresis, syncope, chest pain, palpitations, abdominal pain, nausea/vomiting, melena, LE edema, fever, chills, and cough. Stress Echocardiogram (02/14/2022) showed basal inferior hypokinesis. CCTA (10/31/2022) revealed LM <25%, proximal LAD moderate ISR, D1/D2 mild disease, proximal LCx patent stent, OM1 normal, OM2 moderate disease, RI normal, and RCA severe ISR. In light of patient's risk factors, CCS Class III anginal equivalent symptoms, and abnormal CCTA and prior Stress Echocardiogram results, patient presented for cardiac catheterization w/ possible intervention if clinically indicated. Patient is now s/p cardiac catheterization 11/10/22:  w/ IVLT/Cutting Balloon/ARMANI mid LAD (80% ISR at D1 bifurcation and tandem lesion in midsegment, 75% calcified), and other findings of moderate diffuse disease distal LAD, proximal LCx patent stent (w/ mild ISR), % chronc total occlusion in stent restenosis w/ L-R collaterals, and LM normal; EDP 7 mmHg and no EF measured. Left radial access was used and radial band eventually was removed appropriately and w/o complications.  Pt. admitted o/n for monitoring. Pt. seen and examined at bedside today am. Pt. comfortable, denies any CP, SOB, dizziness, palpitations, L  radial access site stable, no bleeding and hematoma noted,  L radial pulse intact.  VSS. Labs stable o/n. home meds reviewed with Dr. Espinosa  and pt. to be d/c on ASA 81 mg daily, Plavix 75 mg daily, Norvasc 10 mg daily, Labetolol 300 mg BID and Lipitor 80 mg daily.   	  Pt. stable to be d/c as per Dr. Espinosa and to f/u with Dr. Finkelstein in 1-2 week. Patient has been given appropriate discharge instructions including medication regimen, access site management and follow up. Prescriptions have been e-prescribed to patient's preferred pharmacy    Cardiac Rehab (STEMI/NSTEMI/ACS/Unstable Angina/CHF/Post PCI):            *Education on benefits of Cardiac Rehab provided to patient: Yes/No         *Referral and Prescription Given for Cardiac Rehab : Yes/No.  If No, Why Not?         *Pt given list of locations & instructed to contact their insurance company to review list of participating providers: YES    Statin Prescribed (STEMI/NSTEMI/UA &/OR PCI this admission):  Yes/No; If No, Why Not? Patient has a statin allergy    DAPT: Prescriptions for Aspirin/Plavix/Brilinta/Effient e-prescribed to patient's pharmacy Yes/No__.       77 y/o female w/ SHELLFISH ALLERGY (SEVERE) and PMHx HTN, HLD, Type II DM, CAD, and CAD (s/p multiple prior PCI's) who presented to outpatient cardiologist, Dr. Finkelstein, endorsing OZUNA w/ ambulation of 2-3 blocks. Patient also endorsed bilateral lower extremity claudication symptoms at that time, and as per MD note was found to have PAD on arterial duplex and will be planned for peripheral intervention at a later time. Patient denied any dizziness, diaphoresis, syncope, chest pain, palpitations, abdominal pain, nausea/vomiting, melena, LE edema, fever, chills, and cough. Stress Echocardiogram (02/14/2022) showed basal inferior hypokinesis. CCTA (10/31/2022) revealed LM <25%, proximal LAD moderate ISR, D1/D2 mild disease, proximal LCx patent stent, OM1 normal, OM2 moderate disease, RI normal, and RCA severe ISR. In light of patient's risk factors, CCS Class III anginal equivalent symptoms, and abnormal CCTA and prior Stress Echocardiogram results, patient presented for cardiac catheterization w/ possible intervention if clinically indicated. Patient is now s/p cardiac catheterization 11/10/22:  w/ IVLT/Cutting Balloon/ARMANI mid LAD (80% ISR at D1 bifurcation and tandem lesion in midsegment, 75% calcified), and other findings of moderate diffuse disease distal LAD, proximal LCx patent stent (w/ mild ISR), % chronc total occlusion in stent restenosis w/ L-R collaterals, and LM normal; EDP 7 mmHg and no EF measured. Left radial access was used and radial band eventually was removed appropriately and w/o complications.  Pt. admitted o/n for monitoring. Pt. seen and examined at bedside today am. Pt. comfortable, denies any CP, SOB, dizziness, palpitations, L  radial access site stable, no bleeding and hematoma noted,  L radial pulse intact.  VSS. Labs: K 3.2 and Mg 1.6 supplemented with Kdur 40 PO X 2 and Magnesium oxide 800 mg  PO X 1; WBC 11.46 (pt afebrile and asx). home meds reviewed with Dr. Espinosa  and pt. to be d/c on ASA 81 mg daily, Plavix 75 mg daily, Norvasc 10 mg daily, Labetolol 300 mg BID and Lipitor 80 mg daily.   	  Pt. stable to be d/c as per Dr. Espinosa and to f/u with Dr. Finkelstein in 1-2 week. Patient has been given appropriate discharge instructions including medication regimen, access site management and follow up. Prescriptions have been e-prescribed to patient's preferred pharmacy    Cardiac Rehab (STEMI/NSTEMI/ACS/Unstable Angina/CHF/Post PCI):            *Education on benefits of Cardiac Rehab provided to patient: Yes/No         *Referral and Prescription Given for Cardiac Rehab : Yes/No.  If No, Why Not?         *Pt given list of locations & instructed to contact their insurance company to review list of participating providers: YES    Statin Prescribed (STEMI/NSTEMI/UA &/OR PCI this admission):  Yes/No; If No, Why Not? Patient has a statin allergy    DAPT: Prescriptions for Aspirin/Plavix/Brilinta/Effient e-prescribed to patient's pharmacy Yes/No__.

## 2022-11-10 NOTE — PATIENT PROFILE ADULT - FALL HARM RISK - HARM RISK INTERVENTIONS

## 2022-11-11 ENCOUNTER — TRANSCRIPTION ENCOUNTER (OUTPATIENT)
Age: 77
End: 2022-11-11

## 2022-11-11 VITALS
RESPIRATION RATE: 16 BRPM | OXYGEN SATURATION: 100 % | DIASTOLIC BLOOD PRESSURE: 59 MMHG | SYSTOLIC BLOOD PRESSURE: 126 MMHG | HEART RATE: 85 BPM

## 2022-11-11 LAB
A1C WITH ESTIMATED AVERAGE GLUCOSE RESULT: 8.3 % — HIGH (ref 4–5.6)
ANION GAP SERPL CALC-SCNC: 10 MMOL/L — SIGNIFICANT CHANGE UP (ref 5–17)
BUN SERPL-MCNC: 9 MG/DL — SIGNIFICANT CHANGE UP (ref 7–23)
CALCIUM SERPL-MCNC: 9.7 MG/DL — SIGNIFICANT CHANGE UP (ref 8.4–10.5)
CHLORIDE SERPL-SCNC: 101 MMOL/L — SIGNIFICANT CHANGE UP (ref 96–108)
CO2 SERPL-SCNC: 26 MMOL/L — SIGNIFICANT CHANGE UP (ref 22–31)
CREAT SERPL-MCNC: 0.58 MG/DL — SIGNIFICANT CHANGE UP (ref 0.5–1.3)
EGFR: 95 ML/MIN/1.73M2 — SIGNIFICANT CHANGE UP
ESTIMATED AVERAGE GLUCOSE: 192 MG/DL — HIGH (ref 68–114)
GLUCOSE BLDC GLUCOMTR-MCNC: 185 MG/DL — HIGH (ref 70–99)
GLUCOSE BLDC GLUCOMTR-MCNC: 282 MG/DL — HIGH (ref 70–99)
GLUCOSE SERPL-MCNC: 175 MG/DL — HIGH (ref 70–99)
HCT VFR BLD CALC: 36.1 % — SIGNIFICANT CHANGE UP (ref 34.5–45)
HCV AB S/CO SERPL IA: 0.03 S/CO — SIGNIFICANT CHANGE UP
HCV AB SERPL-IMP: SIGNIFICANT CHANGE UP
HGB BLD-MCNC: 12.1 G/DL — SIGNIFICANT CHANGE UP (ref 11.5–15.5)
MAGNESIUM SERPL-MCNC: 1.6 MG/DL — SIGNIFICANT CHANGE UP (ref 1.6–2.6)
MCHC RBC-ENTMCNC: 29.4 PG — SIGNIFICANT CHANGE UP (ref 27–34)
MCHC RBC-ENTMCNC: 33.5 GM/DL — SIGNIFICANT CHANGE UP (ref 32–36)
MCV RBC AUTO: 87.8 FL — SIGNIFICANT CHANGE UP (ref 80–100)
NRBC # BLD: 0 /100 WBCS — SIGNIFICANT CHANGE UP (ref 0–0)
PLATELET # BLD AUTO: 287 K/UL — SIGNIFICANT CHANGE UP (ref 150–400)
POTASSIUM SERPL-MCNC: 3.2 MMOL/L — LOW (ref 3.5–5.3)
POTASSIUM SERPL-SCNC: 3.2 MMOL/L — LOW (ref 3.5–5.3)
RBC # BLD: 4.11 M/UL — SIGNIFICANT CHANGE UP (ref 3.8–5.2)
RBC # FLD: 13.2 % — SIGNIFICANT CHANGE UP (ref 10.3–14.5)
SODIUM SERPL-SCNC: 137 MMOL/L — SIGNIFICANT CHANGE UP (ref 135–145)
WBC # BLD: 11.46 K/UL — HIGH (ref 3.8–10.5)
WBC # FLD AUTO: 11.46 K/UL — HIGH (ref 3.8–10.5)

## 2022-11-11 PROCEDURE — 85347 COAGULATION TIME ACTIVATED: CPT

## 2022-11-11 PROCEDURE — C1769: CPT

## 2022-11-11 PROCEDURE — 93010 ELECTROCARDIOGRAM REPORT: CPT

## 2022-11-11 PROCEDURE — C1894: CPT

## 2022-11-11 PROCEDURE — 83735 ASSAY OF MAGNESIUM: CPT

## 2022-11-11 PROCEDURE — C1887: CPT

## 2022-11-11 PROCEDURE — C1753: CPT

## 2022-11-11 PROCEDURE — 36415 COLL VENOUS BLD VENIPUNCTURE: CPT

## 2022-11-11 PROCEDURE — 82553 CREATINE MB FRACTION: CPT

## 2022-11-11 PROCEDURE — 86803 HEPATITIS C AB TEST: CPT

## 2022-11-11 PROCEDURE — 83036 HEMOGLOBIN GLYCOSYLATED A1C: CPT

## 2022-11-11 PROCEDURE — C1725: CPT

## 2022-11-11 PROCEDURE — C1874: CPT

## 2022-11-11 PROCEDURE — C1761: CPT

## 2022-11-11 PROCEDURE — 82962 GLUCOSE BLOOD TEST: CPT

## 2022-11-11 PROCEDURE — 85730 THROMBOPLASTIN TIME PARTIAL: CPT

## 2022-11-11 PROCEDURE — 99239 HOSP IP/OBS DSCHRG MGMT >30: CPT

## 2022-11-11 PROCEDURE — 85610 PROTHROMBIN TIME: CPT

## 2022-11-11 PROCEDURE — 80061 LIPID PANEL: CPT

## 2022-11-11 PROCEDURE — 80048 BASIC METABOLIC PNL TOTAL CA: CPT

## 2022-11-11 PROCEDURE — 85027 COMPLETE CBC AUTOMATED: CPT

## 2022-11-11 PROCEDURE — 82550 ASSAY OF CK (CPK): CPT

## 2022-11-11 PROCEDURE — 93005 ELECTROCARDIOGRAM TRACING: CPT

## 2022-11-11 PROCEDURE — 80053 COMPREHEN METABOLIC PANEL: CPT

## 2022-11-11 PROCEDURE — 85025 COMPLETE CBC W/AUTO DIFF WBC: CPT

## 2022-11-11 RX ORDER — POTASSIUM CHLORIDE 20 MEQ
40 PACKET (EA) ORAL EVERY 4 HOURS
Refills: 0 | Status: COMPLETED | OUTPATIENT
Start: 2022-11-11 | End: 2022-11-11

## 2022-11-11 RX ORDER — CLOPIDOGREL BISULFATE 75 MG/1
1 TABLET, FILM COATED ORAL
Qty: 0 | Refills: 0 | DISCHARGE

## 2022-11-11 RX ORDER — CLOPIDOGREL BISULFATE 75 MG/1
1 TABLET, FILM COATED ORAL
Qty: 30 | Refills: 11
Start: 2022-11-11 | End: 2023-11-05

## 2022-11-11 RX ORDER — ATORVASTATIN CALCIUM 80 MG/1
1 TABLET, FILM COATED ORAL
Qty: 0 | Refills: 0 | DISCHARGE

## 2022-11-11 RX ORDER — POTASSIUM CHLORIDE 20 MEQ
20 PACKET (EA) ORAL ONCE
Refills: 0 | Status: DISCONTINUED | OUTPATIENT
Start: 2022-11-11 | End: 2022-11-11

## 2022-11-11 RX ORDER — POTASSIUM CHLORIDE 20 MEQ
40 PACKET (EA) ORAL ONCE
Refills: 0 | Status: DISCONTINUED | OUTPATIENT
Start: 2022-11-11 | End: 2022-11-11

## 2022-11-11 RX ORDER — ASPIRIN/CALCIUM CARB/MAGNESIUM 324 MG
1 TABLET ORAL
Qty: 0 | Refills: 0 | DISCHARGE

## 2022-11-11 RX ORDER — ATORVASTATIN CALCIUM 80 MG/1
1 TABLET, FILM COATED ORAL
Qty: 30 | Refills: 3
Start: 2022-11-11 | End: 2023-03-10

## 2022-11-11 RX ORDER — NITROGLYCERIN 6.5 MG
1 CAPSULE, EXTENDED RELEASE ORAL
Qty: 0 | Refills: 0 | DISCHARGE

## 2022-11-11 RX ORDER — MAGNESIUM SULFATE 500 MG/ML
4 VIAL (ML) INJECTION ONCE
Refills: 0 | Status: COMPLETED | OUTPATIENT
Start: 2022-11-11 | End: 2022-11-11

## 2022-11-11 RX ORDER — ASPIRIN/CALCIUM CARB/MAGNESIUM 324 MG
1 TABLET ORAL
Qty: 30 | Refills: 11
Start: 2022-11-11 | End: 2023-11-05

## 2022-11-11 RX ORDER — MAGNESIUM OXIDE 400 MG ORAL TABLET 241.3 MG
800 TABLET ORAL ONCE
Refills: 0 | Status: COMPLETED | OUTPATIENT
Start: 2022-11-11 | End: 2022-11-11

## 2022-11-11 RX ADMIN — Medication 40 MILLIEQUIVALENT(S): at 11:50

## 2022-11-11 RX ADMIN — CLOPIDOGREL BISULFATE 75 MILLIGRAM(S): 75 TABLET, FILM COATED ORAL at 11:50

## 2022-11-11 RX ADMIN — ATORVASTATIN CALCIUM 80 MILLIGRAM(S): 80 TABLET, FILM COATED ORAL at 11:51

## 2022-11-11 RX ADMIN — Medication 1: at 06:47

## 2022-11-11 RX ADMIN — Medication 81 MILLIGRAM(S): at 11:50

## 2022-11-11 RX ADMIN — MAGNESIUM OXIDE 400 MG ORAL TABLET 800 MILLIGRAM(S): 241.3 TABLET ORAL at 11:50

## 2022-11-11 RX ADMIN — Medication 40 MILLIEQUIVALENT(S): at 08:36

## 2022-11-11 RX ADMIN — Medication 300 MILLIGRAM(S): at 05:38

## 2022-11-11 RX ADMIN — AMLODIPINE BESYLATE 10 MILLIGRAM(S): 2.5 TABLET ORAL at 05:38

## 2022-11-11 NOTE — DISCHARGE NOTE NURSING/CASE MANAGEMENT/SOCIAL WORK - PATIENT PORTAL LINK FT
You can access the FollowMyHealth Patient Portal offered by [facility] by registering at the following website: [email or web address]. By joining [facility]’s FollowMyHealth portal, you will also be able to view your health information using other applications (apps) compatible with our system.

## 2022-11-11 NOTE — DISCHARGE NOTE NURSING/CASE MANAGEMENT/SOCIAL WORK - NSDCPEFALRISK_GEN_ALL_CORE
For information on Fall & Injury Prevention, visit: [email or web address] OR  [email or web address] OR  https://www.cdc.gov/steadi/patient.html

## 2022-11-15 ENCOUNTER — APPOINTMENT (OUTPATIENT)
Dept: HEART AND VASCULAR | Facility: CLINIC | Age: 77
End: 2022-11-15

## 2022-11-15 VITALS
HEIGHT: 62 IN | OXYGEN SATURATION: 100 % | SYSTOLIC BLOOD PRESSURE: 155 MMHG | TEMPERATURE: 97.1 F | HEART RATE: 73 BPM | DIASTOLIC BLOOD PRESSURE: 74 MMHG | BODY MASS INDEX: 22.82 KG/M2 | WEIGHT: 124 LBS

## 2022-11-15 VITALS — DIASTOLIC BLOOD PRESSURE: 83 MMHG | HEART RATE: 73 BPM | SYSTOLIC BLOOD PRESSURE: 144 MMHG

## 2022-11-15 PROCEDURE — 99214 OFFICE O/P EST MOD 30 MIN: CPT

## 2022-11-15 NOTE — ASSESSMENT
[FreeTextEntry1] : 1. CAD s/p multiple PCI (most recent 11/10/22: s/p IVUS-guided IVLT and PCI pLAD and mLAD, s/p PCI oD1 with residual RCA ): \par             - will continue long term dual antiplatelet therapy given numerous PCIs in the past. \par             - discussed with patient importance of seeking immediate medical attention if anginal type chest pain develops\par             \par 2. HTN: BP not at ACC/AHA 2017 guideline target: \par             - increase labetalol from 300mg 1 tab po bid to 1.5 tablets (450mg) po bid\par             - continue amlodipine 10mg po qd \par             - if BP remains above target next visit will up titrate anti-HTN regimen \par \par 3. Diabetes type II: not controlled: had 2 perineal infections with SGLT2-I: A1c 8.6% (10/26/22), improved: \par             - continue metformin ER 500mg tabs 2 tabs po bid\par             - continue Trulicity 0.75mg/0.5ml sc q week\par             - discussed with patient therapeutic lifestyle changes to improve glucose metabolism\par             - discussed speaking with her PMD to obtain referral to a new endocrinologist\par \par 4. Dyslipidemia: lipids at goal, LDL 70 (10/26/22): \par             - continue atorvastatin 80mg po daily\par             - discussed with patient therapeutic lifestyle changes to improve lipid metabolism\par \par 5. Carotid artery atherosclerosis: DALLAS 20-49% stenosis, LICA 1-19% stenosis: \par            - will pursue medical management\par            - will follow with serial carotid artery sonograms \par \par 6. Peripheral artery disease: + right mid PTA occlusion with distal reconstitution: \par            - will follow with bilateral lower extremity arterial sonography\par            - will pursue aggressive medical management\par \par \par \par

## 2022-11-15 NOTE — HISTORY OF PRESENT ILLNESS
[FreeTextEntry1] : Ms. Schulz presents for follow up and management of CAD s/p multi-vessel PCI, HTN, dyslipidemia, PAD, and DM2.   On 2/14/22, she had an exercise stress echocardiogram which revealed an EF of 64%, aortic sclerosis, and + basal inferior hypokinesis post 6.6 METS.  At present, she has complaints of bilateral calf claudication to 2 blocks ambulation and OZUNA to 2-3 blocks ambulation.  She denies chest pain.  On 0/27/22, she had bilateral lower extremity arterial sonography which revealed right mPTA occlusion with distal reconstitution, and rPER, lPER, and Robert severely calcified vessels with biphasic flow.  She underwent invasive coronary artery angiogram on 11/10/22 and is s/p IVUS-guided IVLT and PCI pLAD and mLAD, s/p PCI oD1, with pRCA  with L-R collaterals, patent LCX stent. Since the procedure, her chest pain has improved.  \par

## 2022-11-15 NOTE — REASON FOR VISIT
[FreeTextEntry1] : Diagnostic Tests:\par -----------------------------------------------\par ECG:\par 11/10/22: sinus rhythm, normal ECG. \par 10/27/22: sinus rhythm, normal ECG. \par 05/26/22: sinus rhythm, nonspecific T-wave abnormality. \par 10/26/21: sinus rhythm, normal ECG. \par 10/20/20: NSR, nonspecific T-wave abnormality.\par 01/11/19:  NSR, normal ECG. \par 09/24/18  NSR, DAVID, non-specific ST/T changes. \par 10/18/17: NSR, DAVID, non-specific ST/T changes. \par 11/10/16: NSR, non-specific ST/T changes.\par 02/18/15: NSR, non-specific ST/T changes. \par -----------------------------------------------\par Cath:\par 11/10/22: s/p IVUS-guided IVLT and PCI pLAD and mLAD, s/p PCI oD1, pRCA  with L-R collaterals, patent LCX stent. \par 12/21/11: RCA PCI mid and distal, LCX distal 70%, D2 70%\par 12/18/06: patent stents RCA/LAD/LCX, PCI prox RCA\par 12/12/05: RCA 90%, LM 40%, LAD prox 40%, mid 90%, LCX 90%, EF 60%, PCI LCX/LAD/RCA\par 09/12/05: PCI mid LAD, PCI prox RCA\par 09/09/05: RCA prox 90%, mid 50%,LM 40%, LAD prox 40%, mid 90%, EF 60%, PCI LCx\par ------------------------------------------------\par Echo:\par 11/16/20: EF 67%, aortic sclerosis. \par 06/15/16: EF 65%, grade I diastolic dysfunction, mild MR\par 02/08/11: EF 63%, diastolic dysfunction, trace MR/TR\par -------------------------------------------------\par Stress tests:\par 02/14/22: exercise stress echo: EF 64%, aortic sclerosis, + basal inferior hypokinesis post 6.6 METS.\par 12/03/19: exercise stress echo: EF 68%, aortic sclerosis, 7 METS, + exercise-induced ischemia basal inferior wall, normal ECG and PA pressures. \par 03/07/17: exercise stress echo: EF 65%, 7 METS, normal wall motion and PA pressures, hypertensive BP response. \par 10/174/13: dobutamine stress echo: EF 70%, normal wall motion.\par --------------------------------------------------\par Carotid arteries:\par 05/26/22: sono: DALLAS 20-49% stenosis, LICA 1-19% stenosis.\par --------------------------------------------------\par Lower extremity arteries:\par 10/27/22: sono: right mPTA 100% with distal reconstitution, rPER, lPTA,LPER heavily calcified with biphasic flow. \par 05/26/22: sono: possible b/l PER and PTA segmental occlusions. \par 11/16/20: sono: b/l prox ICA 20-49% stenosis. \par ---------------------------------------------------\par CT:\par 10/31/22: CCTA: LM <25%, LAD prox moderate ISR, D1/D2 mild, LCX prox patent stent, OM1 normal, OM2 moderate, RI normal, RCA severe ISR, no significant non-cardiac findings.

## 2022-11-21 ENCOUNTER — NON-APPOINTMENT (OUTPATIENT)
Age: 77
End: 2022-11-21

## 2022-11-25 DIAGNOSIS — E11.51 TYPE 2 DIABETES MELLITUS WITH DIABETIC PERIPHERAL ANGIOPATHY WITHOUT GANGRENE: ICD-10-CM

## 2022-11-25 DIAGNOSIS — I10 ESSENTIAL (PRIMARY) HYPERTENSION: ICD-10-CM

## 2022-11-25 DIAGNOSIS — I25.84 CORONARY ATHEROSCLEROSIS DUE TO CALCIFIED CORONARY LESION: ICD-10-CM

## 2022-11-25 DIAGNOSIS — I25.82 CHRONIC TOTAL OCCLUSION OF CORONARY ARTERY: ICD-10-CM

## 2022-11-25 DIAGNOSIS — I25.110 ATHEROSCLEROTIC HEART DISEASE OF NATIVE CORONARY ARTERY WITH UNSTABLE ANGINA PECTORIS: ICD-10-CM
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
## 2022-11-25 DIAGNOSIS — Z95.5 PRESENCE OF CORONARY ANGIOPLASTY IMPLANT AND GRAFT: ICD-10-CM
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
## 2022-11-25 DIAGNOSIS — E78.5 HYPERLIPIDEMIA, UNSPECIFIED: ICD-10-CM

## 2022-11-25 DIAGNOSIS — Z79.84 LONG TERM (CURRENT) USE OF ORAL HYPOGLYCEMIC DRUGS: ICD-10-CM

## 2022-11-25 DIAGNOSIS — Z91.013 ALLERGY TO SEAFOOD: ICD-10-CM

## 2022-11-25 DIAGNOSIS — T82.855A STENOSIS OF CORONARY ARTERY STENT, INITIAL ENCOUNTER: ICD-10-CM

## 2022-11-29 PROBLEM — I73.9 PERIPHERAL VASCULAR DISEASE, UNSPECIFIED: Chronic | Status: ACTIVE | Noted: 2022-11-04

## 2022-11-29 PROBLEM — E78.5 HYPERLIPIDEMIA, UNSPECIFIED: Chronic | Status: ACTIVE | Noted: 2022-11-04

## 2022-11-29 PROBLEM — E11.9 TYPE 2 DIABETES MELLITUS WITHOUT COMPLICATIONS: Chronic | Status: ACTIVE | Noted: 2022-11-04

## 2022-11-29 PROBLEM — I25.10 ATHEROSCLEROTIC HEART DISEASE OF NATIVE CORONARY ARTERY WITHOUT ANGINA PECTORIS: Chronic | Status: ACTIVE | Noted: 2022-11-04

## 2022-12-15 ENCOUNTER — APPOINTMENT (OUTPATIENT)
Dept: HEART AND VASCULAR | Facility: CLINIC | Age: 77
End: 2022-12-15

## 2023-01-03 ENCOUNTER — APPOINTMENT (OUTPATIENT)
Dept: HEART AND VASCULAR | Facility: CLINIC | Age: 78
End: 2023-01-03
Payer: MEDICARE

## 2023-01-03 VITALS
DIASTOLIC BLOOD PRESSURE: 66 MMHG | WEIGHT: 126 LBS | SYSTOLIC BLOOD PRESSURE: 136 MMHG | HEART RATE: 81 BPM | OXYGEN SATURATION: 98 % | BODY MASS INDEX: 23.19 KG/M2 | HEIGHT: 62 IN

## 2023-01-03 VITALS — DIASTOLIC BLOOD PRESSURE: 80 MMHG | SYSTOLIC BLOOD PRESSURE: 129 MMHG

## 2023-01-03 PROCEDURE — 99214 OFFICE O/P EST MOD 30 MIN: CPT

## 2023-02-07 ENCOUNTER — NON-APPOINTMENT (OUTPATIENT)
Age: 78
End: 2023-02-07

## 2023-02-07 ENCOUNTER — APPOINTMENT (OUTPATIENT)
Dept: HEART AND VASCULAR | Facility: CLINIC | Age: 78
End: 2023-02-07
Payer: MEDICARE

## 2023-02-07 PROCEDURE — 93306 TTE W/DOPPLER COMPLETE: CPT

## 2023-02-08 NOTE — ADDENDUM
[FreeTextEntry1] : 01/09/23: Ms. Schulz has a history of CAD s/p multi-vessel PCI, HTN, dyslipidemia, PAD, and DM2.  Her most recent coronary artery intervention was on 11/10/22 where she has IVUS-guided IVLT and PCI pLAD and mLAD, s/p PTCA oD1 with residual RCA .  As such, she is not able to interrupt dual antiplatelet therapy for 3 months post procedure.  SHe may proceed with colonoscopy and general anesthesia on aspirin and clopidogrel if this is an option.  \par

## 2023-02-08 NOTE — HISTORY OF PRESENT ILLNESS
[FreeTextEntry1] : Ms. Schulz presents for follow up and management of CAD s/p multi-vessel PCI, HTN, dyslipidemia, PAD, and DM2.   On 2/14/22, she had an exercise stress echocardiogram which revealed an EF of 64%, aortic sclerosis, and + basal inferior hypokinesis post 6.6 METS.  At present, she has complaints of bilateral calf claudication to 2 blocks ambulation and OZUNA to 2-3 blocks ambulation.  She denies chest pain.  On 0/27/22, she had bilateral lower extremity arterial sonography which revealed right mPTA occlusion with distal reconstitution, and rPER, lPER, and Robert severely calcified vessels with biphasic flow.  She underwent invasive coronary artery angiogram on 11/10/22 and is s/p IVUS-guided IVLT and PCI pLAD and mLAD, s/p PCI oD1, with pRCA  with L-R collaterals, patent LCX stent. Since the procedure, her chest pain has improved and she feels well overall.

## 2023-02-08 NOTE — REASON FOR VISIT
[FreeTextEntry1] : Diagnostic Tests:\par -----------------------------------------------\par ECG:\par 11/10/22: sinus rhythm, normal ECG. \par 10/27/22: sinus rhythm, normal ECG. \par 05/26/22: sinus rhythm, nonspecific T-wave abnormality. \par 10/26/21: sinus rhythm, normal ECG. \par 10/20/20: NSR, nonspecific T-wave abnormality.\par 01/11/19: NSR, normal ECG. \par 09/24/18: NSR, DAVID, non-specific ST/T changes. \par 10/18/17: NSR, DAVID, non-specific ST/T changes. \par 11/10/16: NSR, non-specific ST/T changes.\par 02/18/15: NSR, non-specific ST/T changes. \par -----------------------------------------------\par Cath:\par 11/10/22: s/p IVUS-guided IVLT and PCI pLAD and mLAD, s/p PTCA oD1, pRCA  with L-R collaterals, patent LCX stent. \par 12/21/11: RCA PCI mid and distal, LCX distal 70%, D2 70%\par 12/18/06: patent stents RCA/LAD/LCX, PCI prox RCA\par 12/12/05: RCA 90%, LM 40%, LAD prox 40%, mid 90%, LCX 90%, EF 60%, PCI LCX/LAD/RCA\par 09/12/05: PCI mid LAD, PCI prox RCA\par 09/09/05: RCA prox 90%, mid 50%,LM 40%, LAD prox 40%, mid 90%, EF 60%, PCI LCx\par ------------------------------------------------\par Echo:\par 11/16/20: EF 67%, aortic sclerosis. \par 06/15/16: EF 65%, grade I diastolic dysfunction, mild MR\par 02/08/11: EF 63%, diastolic dysfunction, trace MR/TR\par -------------------------------------------------\par Stress tests:\par 02/14/22: exercise stress echo: EF 64%, aortic sclerosis, + basal inferior hypokinesis post 6.6 METS.\par 12/03/19: exercise stress echo: EF 68%, aortic sclerosis, 7 METS, + exercise-induced ischemia basal inferior wall, normal ECG and PA pressures. \par 03/07/17: exercise stress echo: EF 65%, 7 METS, normal wall motion and PA pressures, hypertensive BP response. \par 10/174/13: dobutamine stress echo: EF 70%, normal wall motion.\par --------------------------------------------------\par Carotid arteries:\par 05/26/22: sono: DALLAS 20-49% stenosis, LICA 1-19% stenosis.\par --------------------------------------------------\par Lower extremity arteries:\par 10/27/22: sono: right mPTA 100% with distal reconstitution, rPER, lPTA,LPER heavily calcified with biphasic flow. \par 05/26/22: sono: possible b/l PER and PTA segmental occlusions. \par 11/16/20: sono: b/l prox ICA 20-49% stenosis. \par ---------------------------------------------------\par CT:\par 10/31/22: CCTA: LM <25%, LAD prox moderate ISR, D1/D2 mild, LCX prox patent stent, OM1 normal, OM2 moderate, RI normal, RCA severe ISR, no significant non-cardiac findings.

## 2023-02-08 NOTE — ASSESSMENT
[FreeTextEntry1] : 1. CAD s/p multiple PCI (most recent 11/10/22: s/p IVUS-guided IVLT and PCI pLAD and mLAD, s/p PTCA oD1 with residual RCA ): \par             - will continue long term dual antiplatelet therapy given numerous PCIs in the past. \par             - discussed with patient importance of seeking immediate medical attention if anginal type chest pain develops\par             - there are no cardiac contraindications to cardiac rehab at this time\par             \par 2. HTN: BP at ACC/AHA 2017 guideline target: \par             - continue labetalol 300mg 1.5 tablets (450mg) po bid\par             - continue amlodipine 10mg po qd \par \par 3. Diabetes type II: not controlled: had 2 perineal infections with SGLT2-I: A1c 8.6% (10/26/22), improved: \par             - continue metformin ER 500mg tabs 2 tabs po bid\par             - continue Trulicity 0.75mg/0.5ml sc q week\par             - discussed with patient therapeutic lifestyle changes to improve glucose metabolism\par             - discussed speaking with her PMD to obtain referral to a new endocrinologist\par \par 4. Dyslipidemia: lipids at goal, LDL 70 (10/26/22): \par             - continue atorvastatin 80mg po daily\par             - discussed with patient therapeutic lifestyle changes to improve lipid metabolism\par \par 5. Carotid artery atherosclerosis: DALLAS 20-49% stenosis, LICA 1-19% stenosis: \par            - will pursue medical management\par            - will follow with serial carotid artery sonograms \par \par 6. Peripheral artery disease: + right mid PTA occlusion with distal reconstitution: \par            - will follow with bilateral lower extremity arterial sonography\par            - will pursue aggressive medical management\par \par \par \par

## 2023-04-04 ENCOUNTER — APPOINTMENT (OUTPATIENT)
Dept: HEART AND VASCULAR | Facility: CLINIC | Age: 78
End: 2023-04-04

## 2023-09-28 ENCOUNTER — APPOINTMENT (OUTPATIENT)
Dept: HEART AND VASCULAR | Facility: CLINIC | Age: 78
End: 2023-09-28
Payer: MEDICARE

## 2023-09-28 ENCOUNTER — NON-APPOINTMENT (OUTPATIENT)
Age: 78
End: 2023-09-28

## 2023-09-28 VITALS
OXYGEN SATURATION: 98 % | BODY MASS INDEX: 22.26 KG/M2 | SYSTOLIC BLOOD PRESSURE: 178 MMHG | HEART RATE: 64 BPM | HEIGHT: 62 IN | DIASTOLIC BLOOD PRESSURE: 78 MMHG | WEIGHT: 121 LBS | TEMPERATURE: 97.7 F

## 2023-09-28 VITALS — SYSTOLIC BLOOD PRESSURE: 184 MMHG | DIASTOLIC BLOOD PRESSURE: 85 MMHG

## 2023-09-28 PROCEDURE — 93000 ELECTROCARDIOGRAM COMPLETE: CPT

## 2023-09-28 PROCEDURE — 99215 OFFICE O/P EST HI 40 MIN: CPT | Mod: 25

## 2023-09-29 LAB
ALBUMIN SERPL ELPH-MCNC: 4.6 G/DL
ALP BLD-CCNC: 89 U/L
ALT SERPL-CCNC: 20 U/L
ANION GAP SERPL CALC-SCNC: 15 MMOL/L
AST SERPL-CCNC: 26 U/L
BASOPHILS # BLD AUTO: 0.07 K/UL
BASOPHILS NFR BLD AUTO: 0.8 %
BILIRUB SERPL-MCNC: 0.3 MG/DL
BUN SERPL-MCNC: 11 MG/DL
CALCIUM SERPL-MCNC: 10.4 MG/DL
CHLORIDE SERPL-SCNC: 101 MMOL/L
CHOLEST SERPL-MCNC: 231 MG/DL
CO2 SERPL-SCNC: 24 MMOL/L
CREAT SERPL-MCNC: 0.59 MG/DL
EGFR: 94 ML/MIN/1.73M2
EOSINOPHIL # BLD AUTO: 0.09 K/UL
EOSINOPHIL NFR BLD AUTO: 1 %
ESTIMATED AVERAGE GLUCOSE: 203 MG/DL
GLUCOSE SERPL-MCNC: 172 MG/DL
HBA1C MFR BLD HPLC: 8.7 %
HCT VFR BLD CALC: 40.8 %
HDLC SERPL-MCNC: 55 MG/DL
HGB BLD-MCNC: 12.6 G/DL
IMM GRANULOCYTES NFR BLD AUTO: 0.2 %
LDLC SERPL DIRECT ASSAY-MCNC: 116 MG/DL
LYMPHOCYTES # BLD AUTO: 2.1 K/UL
LYMPHOCYTES NFR BLD AUTO: 24.3 %
MAN DIFF?: NORMAL
MCHC RBC-ENTMCNC: 29.5 PG
MCHC RBC-ENTMCNC: 30.9 GM/DL
MCV RBC AUTO: 95.6 FL
MONOCYTES # BLD AUTO: 0.44 K/UL
MONOCYTES NFR BLD AUTO: 5.1 %
NEUTROPHILS # BLD AUTO: 5.93 K/UL
NEUTROPHILS NFR BLD AUTO: 68.6 %
PLATELET # BLD AUTO: 299 K/UL
POTASSIUM SERPL-SCNC: 5 MMOL/L
PROT SERPL-MCNC: 8.2 G/DL
RBC # BLD: 4.27 M/UL
RBC # FLD: 14.2 %
SODIUM SERPL-SCNC: 140 MMOL/L
TRIGL SERPL-MCNC: 411 MG/DL
TSH SERPL-ACNC: 0.84 UIU/ML
WBC # FLD AUTO: 8.65 K/UL

## 2023-12-22 ENCOUNTER — NON-APPOINTMENT (OUTPATIENT)
Age: 78
End: 2023-12-22

## 2023-12-22 ENCOUNTER — APPOINTMENT (OUTPATIENT)
Dept: HEART AND VASCULAR | Facility: CLINIC | Age: 78
End: 2023-12-22
Payer: MEDICARE

## 2023-12-22 PROCEDURE — 93925 LOWER EXTREMITY STUDY: CPT

## 2023-12-22 PROCEDURE — 93306 TTE W/DOPPLER COMPLETE: CPT

## 2023-12-22 PROCEDURE — 93880 EXTRACRANIAL BILAT STUDY: CPT

## 2023-12-26 ENCOUNTER — APPOINTMENT (OUTPATIENT)
Dept: HEART AND VASCULAR | Facility: CLINIC | Age: 78
End: 2023-12-26
Payer: MEDICARE

## 2023-12-26 VITALS
SYSTOLIC BLOOD PRESSURE: 162 MMHG | BODY MASS INDEX: 22.82 KG/M2 | HEIGHT: 62 IN | HEART RATE: 79 BPM | OXYGEN SATURATION: 97 % | TEMPERATURE: 96.4 F | WEIGHT: 124 LBS | DIASTOLIC BLOOD PRESSURE: 90 MMHG

## 2023-12-26 VITALS — SYSTOLIC BLOOD PRESSURE: 130 MMHG | DIASTOLIC BLOOD PRESSURE: 84 MMHG

## 2023-12-26 PROCEDURE — 99215 OFFICE O/P EST HI 40 MIN: CPT

## 2023-12-26 RX ORDER — BIMATOPROST 0.1 MG/ML
0.01 SOLUTION/ DROPS OPHTHALMIC
Qty: 15 | Refills: 0 | Status: ACTIVE | COMMUNITY
Start: 2023-11-16

## 2023-12-26 RX ORDER — NITROGLYCERIN 0.4 MG/1
0.4 TABLET SUBLINGUAL
Qty: 90 | Refills: 3 | Status: ACTIVE | COMMUNITY
Start: 2021-02-19 | End: 1900-01-01

## 2023-12-26 NOTE — HISTORY OF PRESENT ILLNESS
[FreeTextEntry1] : Ms. Schulz presents for follow up and management of CAD s/p multi-vessel PCI, HTN, dyslipidemia, PAD, and DM2.   On 2/14/22, she had an exercise stress echocardiogram which revealed an EF of 64%, aortic sclerosis, and + basal inferior hypokinesis post 6.6 METS.  On 0/27/22, she had bilateral lower extremity arterial sonography which revealed right mPTA occlusion with distal reconstitution, and rPER, lPER, and Robert severely calcified vessels with biphasic flow.  She underwent invasive coronary artery angiogram on 11/10/22 and is s/p IVUS-guided IVLT and PCI pLAD and mLAD, s/p PCI oD1, with pRCA  with L-R collaterals, patent LCX stent. On 12/22/23, she had an echocardiogram which revealed an EF of 67%, mild LVH, aortic sclerosis, trace AI, and mild MR.  At present, she has complaints of exertional fatigue.  She denies chest pain or claudication.  We had an extensive discussion to try to improve multiple poor dietary habits with the goal of achieving an optimal HbA1c (including discontinuing fruit smoothies, fruit juice, and protein powder and replacing that with vegetables and lean protein).

## 2023-12-26 NOTE — ASSESSMENT
[FreeTextEntry1] : ======================================================================================= 1. CAD s/p multiple PCI (most recent 11/10/22: s/p IVUS-guided IVLT and PCI pLAD and mLAD, s/p PTCA oD1 with residual RCA ):  - will continue long term dual antiplatelet therapy given numerous PCIs in the past  - discussed with patient importance of seeking immediate medical attention if anginal type chest pain develops  2. HTN: BP at ACC/AHA 2017 guideline target:  - continue labetalol 1.5 tablets (450mg) po bid  - continue amlodipine 10mg po qd  3. Diabetes type II: not controlled: had 2 perineal infections with SGLT2-I: 8.6% (10/04/23):  - continue metformin ER 500mg tabs 2 tabs po bid  - continue Trulicity 0.75mg/0.5ml sc q week  - discussed with patient therapeutic lifestyle changes to improve glucose metabolism  - follow up with new endocrinologist, Coral Cortez MD  4. Dyslipidemia: lipids not at goal, LDL 63 (10/04/23):   - continue atorvastatin 80mg po daily  - discussed with patient therapeutic lifestyle changes to improve lipid metabolism  5. Carotid artery atherosclerosis: DALLAS 20-49% stenosis, LICA 1-19% stenosis:  - will pursue medical management  - will follow with serial carotid artery sonograms  6. Peripheral artery disease: + right mid PTA occlusion with distal reconstitution:  - will follow with bilateral lower extremity arterial sonography  - will pursue aggressive medical management  7. Research: will enroll in the OCEAN(a) trial: which will compare the effect of treatment with olpasiran (a small interfering RNA to reduce lipoprotein(a)), to placebo, on the risk for coronary heart disease death (CHD death), myocardial infarction, or urgent coronary revascularization in participants with atherosclerotic cardiovascular disease (ASCVD) and elevated Lipoprotein(a)  I spent > 45 minutes of total time on this visit, including time spent face-to-face and non-face-to-face. During this time, I took a relevant history and examined the patient. I reviewed relevant portions of the medical record and formulated a differential diagnosis and plan. I explained the relevant cardiac diagnoses to the patient, as well as the work up and management plan. I answered all questions related to the patient's cardiac conditions.

## 2023-12-26 NOTE — REASON FOR VISIT
Subjective:     Chief Complaint   Patient presents with    Well Child      History was provided by the mother. Maricruz Ramsey is a 5 m.o. female who is brought in for this well child visit. At the start of the appointment, I reviewed the patient's [facility] Epic Chart (including Media scanned in from previous providers) for the active Problem List, all pertinent Past Medical Hx, medications, recent radiologic and laboratory findings. In addition, I reviewed pt's documented Immunization Record and Encounter History. :  2021  Immunization History   Administered Date(s) Administered    XXzF-Hez-UHS 2021, 2021    Hep B, Adol/Ped 2021, 2021    Pneumococcal Conjugate (PCV-13) 2021, 2021    Rotavirus, Live, Monovalent Vaccine 2021, 2021     History of previous adverse reactions to immunizations:no    Current Issues:  Current concerns and/or questions on the part of Antonietta's mother include child is waking up frequently at night-needs a bottle to go back to sleep. Follow up on previous concerns:  none    Social Screening:  Current child-care arrangements:  Lives with mom, maternal grandmother, maternal uncle, and cousin       Review of Systems:  Changes since last visit:  Formula fed-similac sensitive-she drinks about 5 oz every 2-4 hours. She does spit up often. Vitamins: no  Elimination:  Normal:  Poops a few times per day   Sleep: waking up 4-5 times at night to eat. She sleeps in crib. Development: Rolls from front to back, holds head up well, uses arms to push chest off surface, reaches for objects, holds object briefly, babbles, laughs/squeals, social smile, responds to affection, elicits social interaction. Abuse Screening 2021   Are there any signs of abuse or neglect?  No       Birth History    Birth     Length: 1' 7\" (0.483 m)     Weight: 6 lb 7.5 oz (2.935 kg)     HC 32 cm    Apgar     One: 8     Five: 9    Discharge Weight: 6 lb 11.2 oz (3.04 kg)    Delivery Method: Vaginal, Spontaneous    Gestation Age: 45 wks    Feeding: Bottle Fed - Formula    Duration of Labor: 1st: 6h 20m / 2nd: [address] Name: [id number]     Early term, 27 yr old  mother, pregnancy complicated by gestational DM, diet controlled, GBS+ treated with PCN-G x 4, negative rest of PNL, MBT O+, BBT A+, DON negative, discharge bili 10.9 at 61 HOL, in [place], normal BG's, discharge delayed due to maternal EPDS 23 with h/o bipolar disorder, depression and anxiety, was evaluated by  and Psych and started on Zoloft, discharged home on 2021. Passed B hearing screening  Passed CCHD screening. Hepatitis B vaccine given on 2021. Patient Active Problem List    Diagnosis Date Noted    Single liveborn, born in hospital, delivered 2021       No Known Allergies  Past Medical History:   Diagnosis Date    Forehead contusion 2021    [facility], normal head CT and skeletal survey, evaluated by forensice nurse, case [facility]     History reviewed. No pertinent surgical history. Objective:     Visit Vitals  Pulse 131   Temp 98.1 °F (36.7 °C) (Axillary)   Resp 48   Ht (!) 2' 2.18\" (0.665 m)   Wt 19 lb 8.2 oz (8.851 kg)   HC 43 cm   BMI 20.01 kg/m²     97 %ile (Z= 1.89) based on WHO (Girls, 0-2 years) weight-for-age data using vitals from 2021.  81 %ile (Z= 0.89) based on WHO (Girls, 0-2 years) Length-for-age data based on Length recorded on 2021.  85 %ile (Z= 1.03) based on WHO (Girls, 0-2 years) head circumference-for-age based on Head Circumference recorded on 2021. Growth parameters are noted and are appropriate for age, however child's weight for length is slightly elevated.       General:  Alert, acting age appropriate   Skin:  Dry and intact without rashes or lesions   Head:  normal fontanelles, symmetric, normocephalic    Eyes:  sclerae white, pupils equal and reactive, red reflex normal bilaterally   Ears:  TMs and canals clear bilaterally Nose: patent   Mouth:  No perioral or gingival cyanosis or lesions. Tongue is normal in appearance, palate intact, no thrush, no tongue tie. Teeth none present. Lungs:  clear to auscultation bilaterally, no rales, rhonchi or wheezing, no tachypnea, use of accessory muscles or tachypnea. No cough. Heart:  regular rate and rhythm, S1, S2 normal, no murmur, click, rub or gallop   Abdomen:  soft, non-tender. Bowel sounds normal. No masses,  no organomegaly   Screening DDH:  Ortolani's and Cummins's signs absent bilaterally, leg length symmetrical, thigh & gluteal folds symmetrical   :  normal female   Femoral pulses:  present bilaterally   Extremities:  extremities normal, atraumatic, no cyanosis or edema   Neuro:  alert, moves all extremities spontaneously     Assessment and Plan:       ICD-10-CM ICD-9-CM    1. Encounter for routine child health examination without abnormal findings  Z00.129 V20.2 CANCELED: DE CAREGIVER HLTH RISK ASSMT SCORE DOC STND INSTRM   2. Encounter for immunization  Z23 V03.89 DE IM ADM THRU 18YR ANY RTE 1ST/ONLY COMPT VAC/TOX      DE IM ADM THRU 18YR ANY RTE ADDL VAC/TOX COMPT      DE IMMUNIZ ADMIN,INTRANASAL/ORAL,1 VAC/TOX      DTAP, HIB, IPV COMBINED VACCINE      PNEUMOCOCCAL CONJ VACCINE 13 VALENT IM      ROTAVIRUS VACCINE, HUMAN, ATTEN, 2 DOSE SCHED, LIVE, ORAL   3.  Sleep concern  Z76.89 V69.4         Anticipatory guidance: Discussed and/or gave handout on well-child issues at this age including vitamin D supplement if breastfeeding, encouraged that any formula used be iron-fortified, starting solids gradually at 5-6 mos, adding one food at a time q 2 days to see if tolerated, avoiding potential choking hazards (large, spherical, or coin shaped foods) unit, observing while eating; iron supplement for exclusively  infants, avoiding cow's milk until 13 mos old, avoiding putting to bed with bottle, avoid sharing utensils/pacifier safe sleep furniture, sleeping face up to prevent SIDS, placing in crib before completely asleep, most babies sleep through night by 6 mos, car seat issues, including proper placement, risk of falling once learns to roll, avoiding small toys (choking hazard), avoiding infant walkers, never leave unattended except in crib, burn prevention (hot liquids, water heater). AP pelvis x-ray (recommended if over 4 months old) to screen for developmental dysplasia of the hip : no      EPDS not completed prior to patient leaving office today. Reviewed having routine prior to bed that does not include bottle-or something else in between bottle feeding and bed time and nap times to help with transition. Reviewed different sleep training options. Eliminating such frequent night time feedings should help with the elevated weight gain as well but child is healthy on exam.   Immunizations administered today with VIS offered. AVS provided and parents agree with plan. Follow-up and Dispositions    · Return in 2 months (on 2/9/2022) for next well child check or as needed. [FreeTextEntry1] : ======================================================================================= Diagnostic Tests: ----------------------------------------------- EC23: sinus rhythm, LVH with repolarization abnormalities.   11/10/22: sinus rhythm, normal ECG.  10/27/22: sinus rhythm, normal ECG.  22: sinus rhythm, nonspecific T-wave abnormality.  10/26/21: sinus rhythm, normal ECG.  10/20/20: NSR, nonspecific T-wave abnormality. 19: NSR, normal ECG.  18: NSR, DAVID, non-specific ST/T changes.  10/18/17: NSR, DAVID, non-specific ST/T changes.  11/10/16: NSR, non-specific ST/T changes. 02/18/15: NSR, non-specific ST/T changes.  ----------------------------------------------- Cath: 11/10/22: s/p IVUS-guided IVLT and PCI pLAD and mLAD, s/p PTCA oD1, pRCA  with L-R collaterals, patent LCX stent.  11: RCA PCI mid and distal, LCX distal 70%, D2 70% 06: patent stents RCA/LAD/LCX, PCI prox RCA 05: RCA 90%, LM 40%, LAD prox 40%, mid 90%, LCX 90%, EF 60%, PCI LCX/LAD/RCA 05: PCI mid LAD, PCI prox RCA 05: RCA prox 90%, mid 50%,LM 40%, LAD prox 40%, mid 90%, EF 60%, PCI LCx ------------------------------------------------ Echo: 23: EF 67%, mild LVH, aortic sclerosis, trace AI, mild MR.  23: EF 68%, aortic sclerosis.  20: EF 67%, aortic sclerosis.  06/15/16: EF 65%, grade I diastolic dysfunction, mild MR 11: EF 63%, diastolic dysfunction, trace MR/TR ------------------------------------------------- Stress tests: 22: exercise stress echo: EF 64%, aortic sclerosis, + basal inferior hypokinesis post 6.6 METS. 19: exercise stress echo: EF 68%, aortic sclerosis, 7 METS, + exercise-induced ischemia basal inferior wall, normal ECG and PA pressures.  17: exercise stress echo: EF 65%, 7 METS, normal wall motion and PA pressures, hypertensive BP response.  10/174/13: dobutamine stress echo: EF 70%, normal wall motion. -------------------------------------------------- Carotid arteries: 23: sono: b/l prox ICA % stenosis.  22: sono: DALLAS 20-49% stenosis, LICA 1-19% stenosis. -------------------------------------------------- Lower extremity arteries: 23: sono: RPTA 100% occlusion, RPER 20-49% stenosis, left mild diffuse atherosclerosis.  10/27/22: sono: right mPTA 100% with distal reconstitution, rPER, lPTA,LPER heavily calcified with biphasic flow.  22: sono: possible b/l PER and PTA segmental occlusions.  20: sono: b/l prox ICA 20-49% stenosis.  --------------------------------------------------- CT: 10/31/22: CCTA: LM <25%, LAD prox moderate ISR, D1/D2 mild, LCX prox patent stent, OM1 normal, OM2 moderate, RI normal, RCA severe ISR, no significant non-cardiac findings.

## 2024-01-04 ENCOUNTER — RX RENEWAL (OUTPATIENT)
Age: 79
End: 2024-01-04

## 2024-04-16 ENCOUNTER — NON-APPOINTMENT (OUTPATIENT)
Age: 79
End: 2024-04-16

## 2024-04-16 ENCOUNTER — APPOINTMENT (OUTPATIENT)
Dept: HEART AND VASCULAR | Facility: CLINIC | Age: 79
End: 2024-04-16
Payer: MEDICARE

## 2024-04-16 VITALS
WEIGHT: 126.44 LBS | DIASTOLIC BLOOD PRESSURE: 67 MMHG | HEIGHT: 62 IN | HEART RATE: 73 BPM | SYSTOLIC BLOOD PRESSURE: 118 MMHG | OXYGEN SATURATION: 100 % | BODY MASS INDEX: 23.27 KG/M2

## 2024-04-16 PROCEDURE — 99215 OFFICE O/P EST HI 40 MIN: CPT

## 2024-04-16 PROCEDURE — G2211 COMPLEX E/M VISIT ADD ON: CPT

## 2024-04-16 PROCEDURE — 93000 ELECTROCARDIOGRAM COMPLETE: CPT

## 2024-04-16 RX ORDER — DULAGLUTIDE 0.75 MG/.5ML
0.75 INJECTION, SOLUTION SUBCUTANEOUS
Refills: 0 | Status: ACTIVE | COMMUNITY
Start: 2022-01-28

## 2024-04-16 RX ORDER — METFORMIN ER 500 MG 500 MG/1
500 TABLET ORAL
Refills: 0 | Status: ACTIVE | COMMUNITY
Start: 2022-02-04

## 2024-04-16 NOTE — HISTORY OF PRESENT ILLNESS
[FreeTextEntry1] : Ms. Schulz presents for follow up and management of CAD s/p multi-vessel PCI, dyslipidemia (markedly elevated lipoprotein a), PAD, HTN, and DM2.    She underwent invasive coronary artery angiogram on 11/10/22 and is s/p IVUS-guided IVLT and PCI pLAD and mLAD, s/p PCI oD1, with pRCA  with L-R collaterals, patent LCX stent. On 12/22/23, she had an echocardiogram which revealed an EF of 67%, mild LVH, aortic sclerosis, trace AI, and mild MR.  We had an extensive discussion today about her markedly elevated lipoprotein a (> 300), the genetic nature of elevated Lp(a), and the current lack of available therapies.  We also discussed being enrolled in the ACCLAIM-Lp(a) trial which is a study to investigate the effect of lepodisiran on the reduction of major adverse cardiovascular events in adults with elevated lipoprotein(a).

## 2024-04-16 NOTE — ASSESSMENT
[FreeTextEntry1] : ======================================================================================= 1. CAD s/p multiple PCI (most recent 11/10/22: s/p IVUS-guided IVLT and PCI pLAD and mLAD, s/p PTCA oD1 with residual RCA ):  - will continue long term dual antiplatelet therapy given numerous PCIs in the past  - discussed with patient importance of seeking immediate medical attention if anginal type chest pain develops  2. HTN: BP at ACC/AHA 2017 guideline target:  - continue labetalol 300mg po bid  - continue amlodipine 10mg po qd  3. Diabetes type II: not controlled: had 2 perineal infections with SGLT2-I: A1c 8.5% (03/27/24):   - continue metformin ER 500mg tabs 2 tabs po bid  - continue Trulicity 0.75mg/0.5ml sc q week  - discussed with patient therapeutic lifestyle changes to improve glucose metabolism  - follow up with new endocrinologist, Coral Cortez MD  4. Dyslipidemia, markedly elevated lipoprotein a (357, 10/28/21): LDL 67 (03/27/24):   - continue atorvastatin 80mg po daily  - discussed with patient therapeutic lifestyle changes to improve lipid metabolism  - offered enrollment into the ACCLAIM-Lp(a) trial of lepodisirian   - I informed her of the hereditary nature of elevatewd Lp(a) and the need to have all three of her children screened  5. Carotid artery atherosclerosis: DALLAS 20-49% stenosis, LICA 1-19% stenosis:  - will pursue medical management  - will follow with serial carotid artery sonograms  6. Peripheral artery disease: + right mid PTA occlusion with distal reconstitution:  - will follow with bilateral lower extremity arterial sonography  - will pursue aggressive medical management  7. Research: offered screening for the ACCALIM-Lp(a) trail: Study to Investigate the Effect of Lepodisiran on the Reduction of Major Adverse Cardiovascular Events in Adults With Elevated Lipoprotein(a)  I spent > 45 minutes of total time on this visit, including time spent face-to-face and non-face-to-face. During this time, I took a relevant history and examined the patient. I reviewed relevant portions of the medical record and formulated a differential diagnosis and plan. I explained the relevant cardiac diagnoses to the patient, as well as the work up and management plan. I answered all questions related to the patient's cardiac conditions.

## 2024-04-16 NOTE — PHYSICAL EXAM
[General Appearance - Well Developed] : well developed [Normal Appearance] : normal appearance [Well Groomed] : well groomed [General Appearance - Well Nourished] : well nourished [No Deformities] : no deformities [General Appearance - In No Acute Distress] : no acute distress [Normal Conjunctiva] : the conjunctiva exhibited no abnormalities [Eyelids - No Xanthelasma] : the eyelids demonstrated no xanthelasmas [Normal Oral Mucosa] : normal oral mucosa [No Oral Pallor] : no oral pallor [No Oral Cyanosis] : no oral cyanosis [Normal Jugular Venous A Waves Present] : normal jugular venous A waves present [Normal Jugular Venous V Waves Present] : normal jugular venous V waves present [No Jugular Venous Andrea A Waves] : no jugular venous andrea A waves [Respiration, Rhythm And Depth] : normal respiratory rhythm and effort [Exaggerated Use Of Accessory Muscles For Inspiration] : no accessory muscle use [Auscultation Breath Sounds / Voice Sounds] : lungs were clear to auscultation bilaterally [Abdomen Soft] : soft [Abdomen Tenderness] : non-tender [Abdomen Mass (___ Cm)] : no abdominal mass palpated [Abnormal Walk] : normal gait [Gait - Sufficient For Exercise Testing] : the gait was sufficient for exercise testing [Nail Clubbing] : no clubbing of the fingernails [Cyanosis, Localized] : no localized cyanosis [Petechial Hemorrhages (___cm)] : no petechial hemorrhages [Skin Color & Pigmentation] : normal skin color and pigmentation [] : no rash [No Venous Stasis] : no venous stasis [Skin Lesions] : no skin lesions [No Skin Ulcers] : no skin ulcer [No Xanthoma] : no  xanthoma was observed [Oriented To Time, Place, And Person] : oriented to person, place, and time [Affect] : the affect was normal [Mood] : the mood was normal [No Anxiety] : not feeling anxious [Normal Rate] : normal [Normal S1] : normal S1 [Normal S2] : normal S2 [II] : a grade 2 [2+] : left 2+ [No Abnormalities] : the abdominal aorta was not enlarged and no bruit was heard [No Pitting Edema] : no pitting edema present [S3] : no S3 [S4] : no S4 [Right Carotid Bruit] : no bruit heard over the right carotid [Left Carotid Bruit] : no bruit heard over the left carotid [Right Femoral Bruit] : no bruit heard over the right femoral artery [Left Femoral Bruit] : no bruit heard over the left femoral artery

## 2024-04-16 NOTE — REASON FOR VISIT
[Other: ____] : [unfilled] [FreeTextEntry1] : ======================================================================================= Diagnostic Tests: -------------------------------------------------------------- EC24: sinus rhythm, normal ECG.  23: sinus rhythm, LVH with repolarization abnormalities.   11/10/22: sinus rhythm, normal ECG.  10/27/22: sinus rhythm, normal ECG.  22: sinus rhythm, nonspecific T-wave abnormality.  10/26/21: sinus rhythm, normal ECG.  10/20/20: NSR, nonspecific T-wave abnormality. 19: NSR, normal ECG.  18: NSR, DAVID, non-specific ST/T changes.  10/18/17: NSR, DAVID, non-specific ST/T changes.  11/10/16: NSR, non-specific ST/T changes. 02/18/15: NSR, non-specific ST/T changes.  -------------------------------------------------------------- Cath: 11/10/22: s/p IVUS-guided IVLT and PCI pLAD and mLAD, s/p PTCA oD1, pRCA  with L-R collaterals, patent LCX stent.  11: RCA PCI mid and distal, LCX distal 70%, D2 70% 06: patent stents RCA/LAD/LCX, PCI prox RCA 05: RCA 90%, LM 40%, LAD prox 40%, mid 90%, LCX 90%, EF 60%, PCI LCX/LAD/RCA 05: PCI mid LAD, PCI prox RCA 05: RCA prox 90%, mid 50%,LM 40%, LAD prox 40%, mid 90%, EF 60%, PCI LCx -------------------------------------------------------------- Echo: 23: EF 67%, mild LVH, aortic sclerosis, trace AI, mild MR.  23: EF 68%, aortic sclerosis.  20: EF 67%, aortic sclerosis.  06/15/16: EF 65%, grade I diastolic dysfunction, mild MR 11: EF 63%, diastolic dysfunction, trace MR/TR -------------------------------------------------------------- Stress tests: 22: exercise stress echo: EF 64%, aortic sclerosis, + basal inferior hypokinesis post 6.6 METS. 19: exercise stress echo: EF 68%, aortic sclerosis, 7 METS, + exercise-induced ischemia basal inferior wall, normal ECG and PA pressures.  17: exercise stress echo: EF 65%, 7 METS, normal wall motion and PA pressures, hypertensive BP response.  10/174/13: dobutamine stress echo: EF 70%, normal wall motion. -------------------------------------------------------------- Carotid arteries: 23: sono: b/l prox ICA 20-49% stenosis.  22: sono: DALLAS 20-49% stenosis, LICA 1-19% stenosis. -------------------------------------------------------------- Lower extremity arteries: 23: sono: RPTA 100% occlusion, RPER 20-49% stenosis, left mild diffuse atherosclerosis.  10/27/22: sono: right mPTA 100% with distal reconstitution, rPER, lPTA,LPER heavily calcified with biphasic flow.  22: sono: possible b/l PER and PTA segmental occlusions.  20: sono: b/l prox ICA 20-49% stenosis.  -------------------------------------------------------------- CT: 10/31/22: CCTA: LM <25%, LAD prox moderate ISR, D1/D2 mild, LCX prox patent stent, OM1 normal, OM2 moderate, RI normal, RCA severe ISR, no significant non-cardiac findings.

## 2024-04-16 NOTE — REVIEW OF SYSTEMS
[Dyspnea on exertion] : dyspnea during exertion [Chest Discomfort] : chest discomfort [Dysuria] : no dysuria [Muscle Cramps] : muscle cramps [Myalgia] : myalgia [Negative] : Heme/Lymph [Feeling Fatigued] : feeling fatigued [Leg Claudication] : no intermittent leg claudication

## 2024-06-28 PROBLEM — E78.5 DYSLIPIDEMIA: Status: ACTIVE | Noted: 2019-01-21

## 2024-06-28 PROBLEM — I25.10 CAD (CORONARY ARTERY DISEASE): Status: ACTIVE | Noted: 2019-01-21

## 2024-06-28 PROBLEM — E78.41 ELEVATED LIPOPROTEIN A LEVEL: Status: ACTIVE | Noted: 2024-04-16

## 2024-06-28 PROBLEM — E11.9 TYPE II DIABETES MELLITUS: Status: ACTIVE | Noted: 2019-01-21

## 2024-06-28 PROBLEM — I10 HTN (HYPERTENSION), BENIGN: Status: ACTIVE | Noted: 2019-01-21

## 2024-06-28 PROBLEM — Z01.810 PREOPERATIVE CARDIOVASCULAR EXAMINATION: Status: ACTIVE | Noted: 2024-06-28

## 2024-06-28 PROBLEM — I73.9 PERIPHERAL ARTERIAL DISEASE: Status: ACTIVE | Noted: 2022-02-14

## 2024-06-28 PROBLEM — I65.29 CAROTID ATHEROSCLEROSIS: Status: ACTIVE | Noted: 2020-01-13

## 2024-06-28 PROBLEM — R07.9 CHEST PAIN, UNSPECIFIED TYPE: Status: ACTIVE | Noted: 2019-11-11

## 2024-07-01 ENCOUNTER — APPOINTMENT (OUTPATIENT)
Dept: HEART AND VASCULAR | Facility: CLINIC | Age: 79
End: 2024-07-01
Payer: MEDICARE

## 2024-07-01 VITALS — SYSTOLIC BLOOD PRESSURE: 122 MMHG | DIASTOLIC BLOOD PRESSURE: 74 MMHG

## 2024-07-01 VITALS
TEMPERATURE: 97.2 F | HEIGHT: 62 IN | BODY MASS INDEX: 23.42 KG/M2 | SYSTOLIC BLOOD PRESSURE: 132 MMHG | OXYGEN SATURATION: 98 % | WEIGHT: 127.3 LBS | HEART RATE: 72 BPM | DIASTOLIC BLOOD PRESSURE: 74 MMHG

## 2024-07-01 DIAGNOSIS — Z01.810 ENCOUNTER FOR PREPROCEDURAL CARDIOVASCULAR EXAMINATION: ICD-10-CM

## 2024-07-01 DIAGNOSIS — I25.10 ATHEROSCLEROTIC HEART DISEASE OF NATIVE CORONARY ARTERY W/OUT ANGINA PECTORIS: ICD-10-CM

## 2024-07-01 DIAGNOSIS — I73.9 PERIPHERAL VASCULAR DISEASE, UNSPECIFIED: ICD-10-CM

## 2024-07-01 DIAGNOSIS — E78.41 ELEVATED LIPOPROTEIN(A): ICD-10-CM

## 2024-07-01 DIAGNOSIS — R07.9 CHEST PAIN, UNSPECIFIED: ICD-10-CM

## 2024-07-01 DIAGNOSIS — E11.9 TYPE 2 DIABETES MELLITUS W/OUT COMPLICATIONS: ICD-10-CM

## 2024-07-01 DIAGNOSIS — I35.8 OTHER NONRHEUMATIC AORTIC VALVE DISORDERS: ICD-10-CM

## 2024-07-01 DIAGNOSIS — I65.29 OCCLUSION AND STENOSIS OF UNSPECIFIED CAROTID ARTERY: ICD-10-CM

## 2024-07-01 DIAGNOSIS — I10 ESSENTIAL (PRIMARY) HYPERTENSION: ICD-10-CM

## 2024-07-01 DIAGNOSIS — E78.5 HYPERLIPIDEMIA, UNSPECIFIED: ICD-10-CM

## 2024-07-01 PROCEDURE — G2211 COMPLEX E/M VISIT ADD ON: CPT

## 2024-07-01 PROCEDURE — 99215 OFFICE O/P EST HI 40 MIN: CPT

## 2024-12-03 ENCOUNTER — APPOINTMENT (OUTPATIENT)
Dept: HEART AND VASCULAR | Facility: CLINIC | Age: 79
End: 2024-12-03

## 2025-06-05 NOTE — HISTORY OF PRESENT ILLNESS
[FreeTextEntry1] : Ms. Schulz presents for follow up and management of CAD s/p multi-vessel PCI, HTN, dyslipidemia, and DM2. She denies chest pain, OZUNA, or edema. She had an echocardiogram on 06/15/16 which revealed an EF of 65%, grade I diastolic dysfunction, and mild MR. She had an exercise stress echocardiogram on 03/07/17 which revealed an EF of 65%, 7 METS, normal wall motion and PA pressures, and a hypertensive BP response. She has complaints of occasional vaginal spotting and we discussed the importance of seeing a gynecologist since she has been post-menopausal for many years. She is now compliant with medications and her blood pressure has been well controlled as a result.  0 (no pain/absence of nonverbal indicators of pain)